# Patient Record
Sex: FEMALE | Race: BLACK OR AFRICAN AMERICAN | Employment: OTHER | URBAN - METROPOLITAN AREA
[De-identification: names, ages, dates, MRNs, and addresses within clinical notes are randomized per-mention and may not be internally consistent; named-entity substitution may affect disease eponyms.]

---

## 2017-04-21 ENCOUNTER — OFFICE VISIT (OUTPATIENT)
Dept: NEUROLOGY | Age: 62
End: 2017-04-21

## 2017-04-21 VITALS
DIASTOLIC BLOOD PRESSURE: 72 MMHG | OXYGEN SATURATION: 98 % | HEIGHT: 64 IN | RESPIRATION RATE: 18 BRPM | SYSTOLIC BLOOD PRESSURE: 112 MMHG | WEIGHT: 193 LBS | HEART RATE: 70 BPM | BODY MASS INDEX: 32.95 KG/M2

## 2017-04-21 DIAGNOSIS — G89.29 CHRONIC RIGHT-SIDED LOW BACK PAIN WITH RIGHT-SIDED SCIATICA: Primary | ICD-10-CM

## 2017-04-21 DIAGNOSIS — M54.41 CHRONIC RIGHT-SIDED LOW BACK PAIN WITH RIGHT-SIDED SCIATICA: Primary | ICD-10-CM

## 2017-04-21 DIAGNOSIS — R20.9 SKIN SENSATION DISTURBANCE: ICD-10-CM

## 2017-04-21 RX ORDER — ASPIRIN 81 MG/1
TABLET ORAL DAILY
COMMUNITY

## 2017-04-21 RX ORDER — METOPROLOL SUCCINATE 25 MG/1
TABLET, EXTENDED RELEASE ORAL 2 TIMES DAILY
COMMUNITY

## 2017-04-21 RX ORDER — GABAPENTIN 300 MG/1
300 CAPSULE ORAL 3 TIMES DAILY
Qty: 90 CAP | Refills: 1 | Status: SHIPPED | OUTPATIENT
Start: 2017-04-21 | End: 2020-06-01 | Stop reason: SDUPTHER

## 2017-04-21 RX ORDER — LEVOTHYROXINE SODIUM 25 UG/1
50 TABLET ORAL
COMMUNITY

## 2017-04-21 NOTE — PATIENT INSTRUCTIONS

## 2017-04-21 NOTE — PROGRESS NOTES
[facility]   NEW PATIENT EVALUATION/CONSULTATION       PATIENT NAME: Lee Haque    MRN: [id number]    REASON FOR CONSULTATION: Low back pain    04/21/17      Previous records (physician notes, laboratory reports, and radiology reports) and imaging studies were reviewed and summarized. My recommendations will be communicated back to the patient's physician(s) via electronic medical record and/or by [address] mail. HISTORY OF PRESENT ILLNESS:  Lee Haque is a 59 y.o. right handed female presenting for evaluation of low back pain. Sx started several years ago involving LBP with radiation down the posterior RLE to the knee described as \"sharp, shooting\". Sx are rather constant. Denies RLE paresthesias. She does feel the RLE is weak proximally>distally. No similar sx into the LLE. No significant lower back trauma that she recalls. Denies bowel/bladder incontinence or retention. She tried PT [employer]" which was minimally helpful. Also underwent KRISTINE in the past which were helpful. She currently takes naproxen for her LBP every other night. MRI Lumbar spine last completed 2012 for these sx and no significant NF or canal stenosis.       PAST MEDICAL HISTORY:  Past Medical History:   Diagnosis Date    Atrial fibrillation ([address]     Hyperlipemia     Migraine     Sleep apnea     Thyroid disease        PAST SURGICAL HISTORY:  Past Surgical History:   Procedure Laterality Date    HX GYN      HX POLYPECTOMY         FAMILY HISTORY:   Family History   Problem Relation Age of Onset    Headache Mother     Stroke Mother     Heart Disease Father     Stroke Brother     Cancer Maternal Grandmother     Heart Disease Paternal Grandfather          SOCIAL HISTORY:  Social History     Social History    Marital status:      Spouse name: N/A    Number of children: N/A    Years of education: N/A     Social History Main Topics    Smoking status: Never Smoker    Smokeless tobacco: None    Alcohol use No    Drug use: None    Sexual activity: Not Asked     Other Topics Concern    None     Social History Narrative         MEDICATIONS:   Current Outpatient Prescriptions   Medication Sig Dispense Refill    ROSUVASTATIN CALCIUM (CRESTOR PO) Take  by mouth.  levothyroxine (SYNTHROID) 25 mcg tablet Take  by mouth Daily (before breakfast). 1 and 1/2 pills every day.  metoprolol succinate (TOPROL-XL) 25 mg XL tablet Take  by mouth daily.  FEXOFENADINE HCL (ALLEGRA PO) Take  by mouth.  aspirin delayed-release 81 mg tablet Take  by mouth daily. ALLERGIES:  No Known Allergies      REVIEW OF SYSTEMS:  10 point ROS reviewed with patient. Please see scanned document under media. PHYSICAL EXAM:  Vital Signs:   Visit Vitals    /72    Pulse 70    Resp 18    Ht 5' 4\" (1.626 m)    Wt 87.5 kg (193 lb)    SpO2 98%    BMI 33.13 kg/m2        General Medical Exam:  General:  Well appearing, comfortable, in no apparent distress. Eyes/ENT: see cranial nerve examination. Neck: No masses appreciated. Full range of motion without tenderness. Respiratory:  Clear to auscultation, good air entry bilaterally. Cardiac:  Regular rate and rhythm, no murmur. GI:  Soft, non-tender, non-distended abdomen. Bowel sounds normal. No masses, organomegaly. Extremities:  No deformities, edema, or skin discoloration. Skin:  No rashes or lesions. Neurological:  · Mental Status:  Alert and oriented to person, place, and time with fluent speech. · Cranial Nerves:   CNII/III/IV/VI: visual fields full to confrontation, EOMI, PERRL, no ptosis or nystagmus.    CN V: Facial sensation intact bilaterally, masseter 5/5   CN VII: Facial muscles symmetric and strong   CN VIII: Hears finger rub well bilaterally, intact vestibular function   CN IX/X: Normal palatal movement   CN XI: Full strength shoulder shrug bilaterally   CN XII: Tongue protrusion full and midline without fasciculation or atrophy  · Motor: Normal tone and muscle bulk with no pronator drift. Individual muscle group testing:  Shoulder abduction:   Left:5/5   Right : 5/5    Shoulder adduction:   Left:5/5   Right : 5/5    Elbow flexion:      Left:5/5   Right : 5/5  Elbow extension:    Left:5/5   Right : 5/5   Wrist flexion:    Left:5/5   Right : 5/5  Wrist extension:    Left:5/5   Right : 5/5  Arm pronation:   Left:5/5   Right : 5/5  Arm supination:   Left:5/5   Right : 5/5    Finger flexion:    Left:5/5   Right : 5/5    Finger extension:   Left:5/5   Right : 5/5   Finger abduction:  Left:5/5   Right : 5/5   Finger adduction:   Left:5/5   Right : 5/5  Hip flexion:     Left:5/5   Right : 5/5         Hip extension:   Left:5/5   Right : 5/5    Knee flexion:    Left:5/5   Right : 5/5    Knee extension:   Left:5/5   Right : 5/5    Dorsiflexion:     Left:5/5   Right : 5/5  Plantar flexion:    Left:5/5   Right : 5/5      · MSRs: No crossed adductors or clonus. RIGHT  LEFT   Brachioradialis 1+ 1+   Biceps 1+ 1+   Triceps 1+ 1+   Knee 1+ 1+   Achilles trace trace        Plantar response Downward Downward          · Sensation: Decreased pinprick lateral distal RLE, otherwise normal and symmetric perception of temperature, light touch, proprioception, and vibration; (-) Romberg. · Coordination: No dysmetria. Normal rapid alternating movements; finger-to-nose and heel-to- shin testing are within normal limits. · Gait: Normal native and stress (tandem/heel/toe walking). PERTINENT DATA:  INTERNAL RECORDS:  The patient's electronic medical record was reviewed. The relevant details include:     CT Results (maximum last 3): No results found for this or any previous visit. MRI Results (maximum last 3): Results from [place] encounter on 11/02/12   MRI BRAIN W WO CONT   Narrative **Final Report**      ICD Codes / Adm. Diagnosis: 784.0  253.9 / Headache  Unspecified disorder of   the pi  Examination:  MRI BRAIN W AND WO CON - [id number] - Nov 2 2012  5:12PM  Accession No:  [id number]  Reason:  HEADACHE, RIGHT SIDED PARESTHESIAS, ABNORMAL PITUITARY LABS     ADDENDUM   Addendum:    Reportedly the patient refused diffusion-weighted images. This however is   unlikely to compromise pituitary evaluation. 4.  Left greater than right tonsillar ectopia. END OF ADDENDUM     REPORT:  Indication: Headaches. EXAM:  MRI BRAIN W AND WO CON    INDICATION:    HEADACHE, RIGHT SIDED PARESTHESIAS, ABNORMAL PITUITARY LABS    COMPARISON:  None. CONTRAST: 19 ml Magnevist    TECHNIQUE:  MR imaging of the brain was performed with sagittal T1, axial T1, T2, FLAIR,   GRE, DWI/ADC; pre and post contrast multiplanar T1 utilizing 19 mL   Magnevist.  Attention pituitary    FINDINGS: The ventricular size and configuration are normal. There are no   areas of abnormal signal in the cerebral hemispheres, brainstem or   cerebellum. The pituitary stalk is midline. There are no pathologic low   signal intensity or high signal intensity foci within the pituitary and the   chiasm is not encroached upon. No diffusion weighted sequences were   performed and therefore does not or absence of acute ischemia cannot be   completely evaluated. .  Normal appearing flow-voids are present in the   vertebral, basilar and carotid artery systems. There is 7 mm left and   approximately 3 mm right tonsillar ectopia. .  There is marked mucosal   thickening in the paranasal sinuses. . There is no abnormal parenchymal or   meningeal enhancement. The pituitary is 11 x 7 x 10 mm in size. IMPRESSION:  1. Study incomplete in that diffusion weighted images were not performed. If clinically desired, this can be performed in the near future at no   additional charge to complete the examination in that regard if clinically   desired. Staff has been informed. 2.  Sinusitis  3. Pituitary appears unremarkable.                  **SEE ADDENDUM AT TOP OF THE REPORT**    Signing/Reading Doctor: Rusty Avila ([id number]) Addendum Reading: Wade Edwards ([id number])  Approved: Rusty Avila ([id number])  Nov 5 2012 10:45AM                       Addendum Approved: Rusty Avila ([id number])  11/05/2012            Results from Hospital Encounter encounter on 03/22/12   MRI SPINE LUMBAR WITHOUT CONTRAST   Narrative **Final Report**      ICD Codes / Adm. Diagnosis: 724.4  722.52 / THORACIC OR LUMBOSACRAL NEURIT    DEGENERATION OF LUMBAR OR SACHA  Examination:  MRI L SPINE WO CON  - [id number] - Mar 22 2012  4:31PM  Accession No:  [id number]  Reason:  Thoracic or lumbosacral neuritis or radiculitis,   unspecified;Degeneration of lum      REPORT:  CLINICAL INDICATION: Right leg pain, back pain. Technical factors: Sagittal T1-weighted T2-weighted STIR, axial T1 and   T2-weighted L1-S1. Comparison 2008. There is no bone marrow replacement. The conus appears unremarkable and the   alignment is satisfactory. L2-L3 shows central disc bulging. There is mild bilateral foraminal   encroachment. L3-L4 shows central disc bulging. There is no lateralization. Mild bilateral   foraminal encroachment. L4-L5 shows chronic disc degeneration with adjacent endplate changes. There   is a central disc protrusion. There is no significant canal compromise. There is mild bilateral foraminal narrowing. L5-S1 shows central disc bulging. There is no stenosis or disc herniation. IMPRESSION: Multilevel disc degeneration spondylosis. No focal disc   herniation or stenosis. Signing/Reading Doctor: Trini Finley ([id number])    Approved: Trini Finley ([id number])  03/22/2012                                      ASSESSMENT:      ICD-10-CM ICD-9-CM    1. Chronic right-sided low back pain with right-sided sciatica M54.41 724.2       EMG LIMITED      gabapentin (NEURONTIN) 300 mg capsule   2.  Skin sensation disturbance R20.9 782.0       EMG LIMITED      gabapentin (NEURONTIN) 300 mg capsule   59year old female presenting with chronic LBP w/radicular sx involving the posterior RLE x several years. MRI Lumbar spine last completed 2012 and without significant canal or neuroforaminal stenosis, noted degenerative disc disease. Underwent PT/KRISTINE in the past which was somewhat helpful. Neurological examination today reveals decreased small fiber modalities L5 distribution of RLE. Will proceed with electrodiagnostic testing to exclude right L5 radiculopathy. Discussed trial of gabapentin for neuropathic pain which may be titrated if needed at f/u. She defers additional physical therapy at this time. PLAN:  · EMG RLE  · Start gabapentin 300mg TID      Follow-up Disposition: 1-2 weeks after EMG      I have discussed the diagnosis with the patient and the intended plan as seen in the above orders. Patient is in agreement. The patient has received an after-visit summary and questions were answered concerning future plans. I have discussed medication side effects and warnings with the patient as well. Radha Workman DO  Staff Neurologist  Diplomate, [facility] Board of Psychiatry & Neurology

## 2017-04-21 NOTE — MR AVS SNAPSHOT
Visit Information Date & Time Provider Department Dept. Phone Encounter #  
 4/21/2017  8:00 AM Daryl Willis DO [facility] at [address] [id number] Upcoming Health Maintenance Date Due Hepatitis C Screening 1955 DTaP/Tdap/Td series (1 - Tdap) 10/13/1974 PAP AKA CERVICAL CYTOLOGY 10/13/1974 FOBT Q 1 YEAR AGE 50-75 10/13/2003 ZOSTER VACCINE AGE 60> 10/13/2013 INFLUENZA AGE 9 TO ADULT 8/1/2016 BREAST CANCER SCRN MAMMOGRAM 10/17/2018 Allergies as of 4/21/2017  Review Complete On: 4/21/2017 By: Daryl Willis DO No Known Allergies Current Immunizations  Never Reviewed No immunizations on file. Not reviewed this visit You Were Diagnosed With   
  
 Codes Comments Chronic right-sided low back pain with right-sided sciatica    -  Primary ICD-10-CM: M54.41, G89.29 ICD-9-CM: 724.2, 724.3, 338.29 Skin sensation disturbance     ICD-10-CM: R20.9 ICD-9-CM: 875. 0 Vitals BP Pulse Resp Height(growth percentile) Weight(growth percentile) SpO2  
 112/72 70 18 5' 4\" (1.626 m) 193 lb (87.5 kg) 98% BMI OB Status Smoking Status 33.13 kg/m2 Hysterectomy Never Smoker Vitals History BMI and BSA Data Body Mass Index Body Surface Area  
 33.13 kg/m 2 1.99 m 2 Preferred Pharmacy [facility] [address], [address] [facility] [phone] Your Updated Medication List  
  
   
This list is accurate as of: 4/21/17  8:42 AM.  Always use your most recent med list. ALLEGRA PO Take  by mouth. aspirin delayed-release 81 mg tablet Take  by mouth daily. CRESTOR PO Take  by mouth.  
  
 gabapentin 300 mg capsule Commonly known as:  NEURONTIN Take 1 Cap by mouth three (3) times daily. metoprolol succinate 25 mg XL tablet Commonly known as:  TOPROL-XL  
 Take  by mouth daily. synthroid 25 mcg tablet Generic drug:  levothyroxine Take  by mouth Daily (before breakfast). 1 and 1/2 pills every day. Prescriptions Sent to Pharmacy Refills  
 gabapentin (NEURONTIN) 300 mg capsule 1 Sig: Take 1 Cap by mouth three (3) times daily. Class: Normal  
 Pharmacy: [facility]  Gwendolyn Billingsley, [address] [place] [address] #: [phone] Route: Oral  
  
To-Do List   
 04/21/2017 Neurology:  EMG LIMITED Patient Instructions A Healthy Lifestyle: Care Instructions Your Care Instructions A healthy lifestyle can help you feel good, stay at a healthy weight, and have plenty of energy for both work and play. A healthy lifestyle is something you can share with your whole family. A healthy lifestyle also can lower your risk for serious health problems, such as high blood pressure, heart disease, and diabetes. You can follow a few steps listed below to improve your health and the health of your family. Follow-up care is a key part of your treatment and safety. Be sure to make and go to all appointments, and call your doctor if you are having problems. Its also a good idea to know your test results and keep a list of the medicines you take. How can you care for yourself at home? · Do not eat too much sugar, fat, or fast foods. You can still have dessert and treats now and then. The goal is moderation. · Start small to improve your eating habits. Pay attention to portion sizes, drink less juice and soda pop, and eat more fruits and vegetables. ¨ Eat a healthy amount of food. A 3-ounce serving of meat, for example, is about the size of a deck of cards. Fill the rest of your plate with vegetables and whole grains. ¨ Limit the amount of soda and sports drinks you have every day. Drink more water when you are thirsty. ¨ Eat at least 5 servings of fruits and vegetables every day.  It may seem like a lot, but it is not hard to reach this goal. A serving or helping is 1 piece of fruit, 1 cup of vegetables, or 2 cups of leafy, raw vegetables. Have an apple or some carrot sticks as an afternoon snack instead of a candy bar. Try to have fruits and/or vegetables at every meal. 
· Make exercise part of your daily routine. You may want to start with simple activities, such as walking, bicycling, or slow swimming. Try to be active 30 to 60 minutes every day. You do not need to do all 30 to 60 minutes all at once. For example, you can exercise 3 times a day for 10 or 20 minutes. Moderate exercise is safe for most people, but it is always a good idea to talk to your doctor before starting an exercise program. 
· Keep moving. Scooby Stands the lawn, work in the garden, or Pantea. Take the stairs instead of the elevator at work. · If you smoke, quit. People who smoke have an increased risk for heart attack, stroke, cancer, and other lung illnesses. Quitting is hard, but there are ways to boost your chance of quitting tobacco for good. ¨ Use nicotine gum, patches, or lozenges. ¨ Ask your doctor about stop-smoking programs and medicines. ¨ Keep trying. In addition to reducing your risk of diseases in the future, you will notice some benefits soon after you stop using tobacco. If you have shortness of breath or asthma symptoms, they will likely get better within a few weeks after you quit. · Limit how much alcohol you drink. Moderate amounts of alcohol (up to 2 drinks a day for men, 1 drink a day for women) are okay. But drinking too much can lead to liver problems, high blood pressure, and other health problems. Family health If you have a family, there are many things you can do together to improve your health. · Eat meals together as a family as often as possible. · Eat healthy foods. This includes fruits, vegetables, lean meats and dairy, and whole grains. · Include your family in your fitness plan. Most people think of activities such as jogging or tennis as the way to fitness, but there are many ways you and your family can be more active. Anything that makes you breathe hard and gets your heart pumping is exercise. Here are some tips: 
¨ Walk to do errands or to take your child to school or the bus. ¨ Go for a family bike ride after dinner instead of watching TV. Where can you learn more? Go to http://celia-tony.info/. Enter K594 in the search box to learn more about \"A Healthy Lifestyle: Care Instructions. \" Current as of: July 26, 2016 Content Version: 11.2 © 4053-2172 [employer]. Care instructions adapted under license by [employer] (which disclaims liability or warranty for this information). If you have questions about a medical condition or this instruction, always ask your healthcare professional. [address] any warranty or liability for your use of this information. Introducing [facility]! Osmin Dai introduces [employer] patient portal. Now you can access parts of your medical record, email your doctor's office, and request medication refills online. 1. In your internet browser, go to [email or web address] 2. Click on the First Time User? Click Here link in the Sign In box. You will see the New Member Sign Up page. 3. Enter your [employer] Access Code exactly as it appears below. You will not need to use this code after youve completed the sign-up process. If you do not sign up before the expiration date, you must request a new code. · [employer] Access Code: [email or web address] Expires: 7/20/2017  7:43 AM 
 
4. Enter the last four digits of your Social Security Number (xxxx) and Date of Birth (mm/dd/yyyy) as indicated and click Submit. You will be taken to the next sign-up page. 5. Create a [employer] ID. This will be your [employer] login ID and cannot be changed, so think of one that is secure and easy to remember. 6. Create a [employer] password. You can change your password at any time. 7. Enter your Password Reset Question and Answer. This can be used at a later time if you forget your password. 8. Enter your e-mail address. You will receive e-mail notification when new information is available in [address] 9. Click Sign Up. You can now view and download portions of your medical record. 10. Click the Download Summary menu link to download a portable copy of your medical information. If you have questions, please visit the Frequently Asked Questions section of the [employer] website. Remember, [employer] is NOT to be used for urgent needs. For medical emergencies, dial 911. Now available from your iPhone and Android! Please provide this summary of care documentation to your next provider. Your primary care clinician [facility]. If you have any questions after today's visit, please call [phone].

## 2017-05-05 ENCOUNTER — OFFICE VISIT (OUTPATIENT)
Dept: NEUROLOGY | Age: 62
End: 2017-05-05

## 2017-05-05 VITALS
HEIGHT: 64 IN | WEIGHT: 193 LBS | DIASTOLIC BLOOD PRESSURE: 62 MMHG | BODY MASS INDEX: 32.95 KG/M2 | RESPIRATION RATE: 18 BRPM | TEMPERATURE: 98 F | HEART RATE: 58 BPM | SYSTOLIC BLOOD PRESSURE: 118 MMHG | OXYGEN SATURATION: 98 %

## 2017-05-05 DIAGNOSIS — M79.609 PARESTHESIA AND PAIN OF RIGHT EXTREMITY: Primary | ICD-10-CM

## 2017-05-05 DIAGNOSIS — R20.9 SKIN SENSATION DISTURBANCE: ICD-10-CM

## 2017-05-05 DIAGNOSIS — R20.2 PARESTHESIA AND PAIN OF RIGHT EXTREMITY: Primary | ICD-10-CM

## 2017-05-05 NOTE — PROGRESS NOTES
[facility]  [address] [place] [address]  Phone [phone] Fax [phone]  Test Date:  2017    Patient: Kari Lopez : 1955 Physician: Jaeysh Huff    Sex: Female Height: 5' 4\" Ref Ramos Silver   ID#: [id number] Weight: 193 lbs. Technician: Jorgito Pillai     Patient Complaints:  59year old female presenting with chronic low back pain w/radicular symptoms x several years. NCV & EMG Findings:  Evaluation of the left Sup Peroneal sensory and the right Sup Peroneal sensory nerves showed no response (14 cm). The left sural sensory and the right sural sensory nerves showed no response (Calf). All remaining nerves  were within normal limits. All left vs. right side differences were within normal limits. All examined muscles (as indicated in the following table) showed no evidence of electrical instability. Impression:  Extensive electrodiagnostic evaluation of the right lower extremity with additional nerve conduction studies of the left lower extremity reveal the followin. Absent sural and superficial peroneal sensory responses in both lower extremities. Findings may be normal for this age group or associated with technical factors given preserved large fiber sensory function on examination and normal needle examination. 2. No evidence of a right lumbosacral motor radiculopathy. ___________________________  Anastasiya Peralta,   Diplomate, American Board of Psychiatry & Neurology         Nerve Conduction Studies  Anti Sensory Summary Table     Stim Site NR Peak (ms) Norm Peak (ms) P-T Amp (µV) Norm P-T Amp Site1 Site2 Delta-P (ms) Dist (cm) Shade (m/s) Norm Shade (m/s)   Left Sup Peroneal Anti Sensory (Ant Lat Mall)  30.9°C   14 cm NR  <4.4  >5.0 14 cm Ant Lat Mall  14.0  >32   Right Sup Peroneal Anti Sensory (Ant Lat Mall)  31.5°C   14 cm NR  <4.4  >5.0 14 cm Ant Lat Mall  14.0 >32   Left Sural Anti Sensory (Lat Mall)  31°C   Calf NR  <4.0  >5.0 Calf Lat Mall  14.0  >35   Right Sural Anti Sensory (Lat Mall)  31.6°C   Calf NR  <4.0  >5.0 Calf Lat Mall  14.0  >35     Motor Summary Table     Stim Site NR Onset (ms) Norm Onset (ms) O-P Amp (mV) Norm O-P Amp Site1 Site2 Delta-0 (ms) Dist (cm) Shade (m/s) Norm Shade (m/s)   Left Peroneal Motor (Ext Dig Brev)  30.8°C   Ankle    3.2 <6.1 8.9 >2.5 B Fib Ankle 7.6 34.0 45 >38   B Fib    10.8  6.7  Poplt B Fib 1.2 10.0 83 >40   Poplt    12.0  6.5          Right Peroneal Motor (Ext Dig Brev)  31.9°C   Ankle    3.4 <6.1 8.5 >2.5 B Fib Ankle 7.3 34.0 47 >38   B Fib    10.7  6.8  Poplt B Fib 1.1 11.0 100 >40   Poplt    11.8  6.8          Left Tibial Motor (Abd Degroot Brev)  31.1°C   Ankle    5.3 <6.1 3.5 >3.0 Knee Ankle 6.5 35.0 54 >35   Knee    11.8  7.4          Right Tibial Motor (Abd Degroot Brev)  32.1°C   Ankle    4.6 <6.1 4.8 >3.0 Knee Ankle 7.7 36.0 47 >35   Knee    12.3  9.0            EMG     Side Muscle Nerve Root Ins Act Fibs Psw Amp Dur Poly Recrt Int Walker Kvng Comment   Right Ext Dig Brev Dp Br Peronel L5, S1 Nml Nml Nml Nml Nml 0 Nml Nml    Right AbdHallucis MedPlantar S1-2 Nml Nml Nml Nml Nml 0 Nml Nml    Right PostTibialis Tibial L5, S1 Nml Nml Nml Nml Nml 0 Nml Nml    Right Gastroc Tibial S1-2 Nml Nml Nml Nml Nml 0 Nml Nml    Right AntTibialis Dp Br Peronel L4-5 Nml Nml Nml Nml Nml 0 Nml Nml    Right RectFemoris Femoral L2-4 Nml Nml Nml Nml Nml 0 Nml Nml    Right GluteusMed SupGluteal L5-S1 Nml Nml Nml Nml Nml 0 Nml Nml    Right Lumbo Parasp Low Rami L5-S1 Nml Nml Nml               Waveforms:

## 2017-06-21 ENCOUNTER — OFFICE VISIT (OUTPATIENT)
Dept: NEUROLOGY | Age: 62
End: 2017-06-21

## 2017-06-21 VITALS
SYSTOLIC BLOOD PRESSURE: 136 MMHG | DIASTOLIC BLOOD PRESSURE: 74 MMHG | HEART RATE: 62 BPM | RESPIRATION RATE: 18 BRPM | BODY MASS INDEX: 32.27 KG/M2 | WEIGHT: 188 LBS | OXYGEN SATURATION: 98 %

## 2017-06-21 DIAGNOSIS — G89.29 CHRONIC RIGHT-SIDED LOW BACK PAIN, WITH SCIATICA PRESENCE UNSPECIFIED: Primary | ICD-10-CM

## 2017-06-21 DIAGNOSIS — M54.5 CHRONIC RIGHT-SIDED LOW BACK PAIN, WITH SCIATICA PRESENCE UNSPECIFIED: Primary | ICD-10-CM

## 2017-06-21 NOTE — PATIENT INSTRUCTIONS

## 2017-06-21 NOTE — PROGRESS NOTES
Neurology Clinic Follow up Note    Patient ID:  Bimal Zambrano  [id number]  13 y.o.  1955      Ms. Buster Mckenna is here for follow up today of  Chief Complaint   Patient presents with    Results          Last Appointment With Me:  4/21/2017       Interval History:   Patient returns for f/u of LBP w/radicular sx involving the posterior RLE x several years. She reports an episode of LBP with radicular pain going down the RLE last week. Pain appears somewhat improved overall on gabapentin. She is taking this once in the evenings due to fatigue during the AM.     EMG completed and no evidence of R lumbosacral radiculopathy. Absence sensory responses likely technical.        PMHx/ PSHx/ FHx/ SHx:  Reviewed and unchanged previous visit. ROS:  Comprehensive review of systems negative except for as noted above. Objective:       Meds:  Current Outpatient Prescriptions   Medication Sig Dispense Refill    ROSUVASTATIN CALCIUM (CRESTOR PO) Take  by mouth.  levothyroxine (SYNTHROID) 25 mcg tablet Take  by mouth Daily (before breakfast). 1 and 1/2 pills every day.  metoprolol succinate (TOPROL-XL) 25 mg XL tablet Take  by mouth daily.  FEXOFENADINE HCL (ALLEGRA PO) Take  by mouth.  aspirin delayed-release 81 mg tablet Take  by mouth daily.  gabapentin (NEURONTIN) 300 mg capsule Take 1 Cap by mouth three (3) times daily. (Patient taking differently: Take 300 mg by mouth daily.) 90 Cap 1       Exam:  Visit Vitals    /74    Pulse 62    Resp 18    Wt 85.3 kg (188 lb)    SpO2 98%    BMI 32.27 kg/m2     NEUROLOGICAL EXAM:  General: Awake, alert, speech fluent  CN: PERRL, EOMI without nystagmus, VFF to confrontation, facial sensation and strength are normal and symmetric, hearing is intact to finger rub bilaterally, palate and tongue movements are intact and symmetric. Motor: Normal tone, bulk and strength bilaterally.   Reflexes: 1/4 and symmetric, plantar stimulation is flexor. Coordination: FNF, RADHA, HTS intact. Sensation: LT intact throughout. Gait: Normal-based and steady. LABS  No results found for this or any previous visit. EMG:  Impression:  Extensive electrodiagnostic evaluation of the right lower extremity with additional nerve conduction studies of the left lower extremity reveal the followin. Absent sural and superficial peroneal sensory responses in both lower extremities. Findings may be normal for this age group or associated with technical factors given preserved large fiber sensory function on examination and normal needle examination. 2. No evidence of a right lumbosacral motor radiculopathy. Assessment:     Encounter Diagnoses     ICD-10-CM ICD-9-CM   1. Chronic right-sided low back pain, with sciatica presence unspecified M54.5 724.2    G89.29 35.29   59year old female returning for f/u of chronic LBP w/radicular sx involving the posterior RLE x several years. MRI Lumbar spine last completed  and without significant canal or neuroforaminal stenosis, noted degenerative disc disease. Underwent PT/K[facility]STINE in the past which was somewhat helpful. No evidence of a right lumbosacral radiculopathy on electrodiagnostic testing. Probable musculoskeletal etiology. Sx are attenuated with gabapentin, although she is not tolerating AM dosing. Suggest increasing single dose in the evening for better pain control. She is interested in pursuing additional PT. Plan:   Increase gabapentin to 600mg in the evenings after work to prevent morning fatigue  Physical therapy for chronic R LBP    Follow-up Disposition:  Return if symptoms worsen or fail to improve.       Signed:  Kailash Johns DO  2017

## 2017-06-21 NOTE — MR AVS SNAPSHOT
Visit Information Date & Time Provider Department Dept. Phone Encounter #  
 6/21/2017 10:00 AM Daryl Willis, [facility] at [address] [id number] Follow-up Instructions Return if symptoms worsen or fail to improve. Upcoming Health Maintenance Date Due Hepatitis C Screening 1955 DTaP/Tdap/Td series (1 - Tdap) 10/13/1974 PAP AKA CERVICAL CYTOLOGY 10/13/1974 FOBT Q 1 YEAR AGE 50-75 10/13/2003 ZOSTER VACCINE AGE 60> 10/13/2013 INFLUENZA AGE 9 TO ADULT 8/1/2017 BREAST CANCER SCRN MAMMOGRAM 10/17/2018 Allergies as of 6/21/2017  Review Complete On: 6/21/2017 By: Daryl Willis, DO No Known Allergies Current Immunizations  Never Reviewed No immunizations on file. Not reviewed this visit You Were Diagnosed With   
  
 Codes Comments Chronic right-sided low back pain, with sciatica presence unspecified    -  Primary ICD-10-CM: M54.5, G89.29 ICD-9-CM: 724.2, 338.29 Vitals BP Pulse Resp Weight(growth percentile) SpO2 BMI  
 136/74 62 18 188 lb (85.3 kg) 98% 32.27 kg/m2 OB Status Smoking Status Hysterectomy Never Smoker Vitals History BMI and BSA Data Body Mass Index Body Surface Area  
 32.27 kg/m 2 1.96 m 2 Preferred Pharmacy Pharmacy Name Phone [facility]  [address], [address] [place] [address] [phone] Your Updated Medication List  
  
   
This list is accurate as of: 6/21/17 10:16 AM.  Always use your most recent med list. ALLEGRA PO Take  by mouth. aspirin delayed-release 81 mg tablet Take  by mouth daily. CRESTOR PO Take  by mouth.  
  
 gabapentin 300 mg capsule Commonly known as:  NEURONTIN Take 1 Cap by mouth three (3) times daily. metoprolol succinate 25 mg XL tablet Commonly known as:  TOPROL-XL Take  by mouth daily. synthroid 25 mcg tablet Generic drug:  levothyroxine Take  by mouth Daily (before breakfast). 1 and 1/2 pills every day. We Performed the Following REFERRAL TO PHYSICAL THERAPY [OLR94 Custom] Comments:  
 Please evaluate patient for chronic LBP, no evidence of radiculopathy Follow-up Instructions Return if symptoms worsen or fail to improve. Referral Information Referral ID Referred By Referred To  
  
 [id number] Nadia CID Not Available Visits Status Start Date End Date 1 New Request 6/21/17 6/21/18 If your referral has a status of pending review or denied, additional information will be sent to support the outcome of this decision. Patient Instructions A Healthy Lifestyle: Care Instructions Your Care Instructions A healthy lifestyle can help you feel good, stay at a healthy weight, and have plenty of energy for both work and play. A healthy lifestyle is something you can share with your whole family. A healthy lifestyle also can lower your risk for serious health problems, such as high blood pressure, heart disease, and diabetes. You can follow a few steps listed below to improve your health and the health of your family. Follow-up care is a key part of your treatment and safety. Be sure to make and go to all appointments, and call your doctor if you are having problems. Its also a good idea to know your test results and keep a list of the medicines you take. How can you care for yourself at home? · Do not eat too much sugar, fat, or fast foods. You can still have dessert and treats now and then. The goal is moderation. · Start small to improve your eating habits. Pay attention to portion sizes, drink less juice and soda pop, and eat more fruits and vegetables. ¨ Eat a healthy amount of food. A 3-ounce serving of meat, for example, is about the size of a deck of cards.  Fill the rest of your plate with vegetables and whole grains. ¨ Limit the amount of soda and sports drinks you have every day. Drink more water when you are thirsty. ¨ Eat at least 5 servings of fruits and vegetables every day. It may seem like a lot, but it is not hard to reach this goal. A serving or helping is 1 piece of fruit, 1 cup of vegetables, or 2 cups of leafy, raw vegetables. Have an apple or some carrot sticks as an afternoon snack instead of a candy bar. Try to have fruits and/or vegetables at every meal. 
· Make exercise part of your daily routine. You may want to start with simple activities, such as walking, bicycling, or slow swimming. Try to be active 30 to 60 minutes every day. You do not need to do all 30 to 60 minutes all at once. For example, you can exercise 3 times a day for 10 or 20 minutes. Moderate exercise is safe for most people, but it is always a good idea to talk to your doctor before starting an exercise program. 
· Keep moving. Americo Vinod the lawn, work in the garden, or [employer]. Take the stairs instead of the elevator at work. · If you smoke, quit. People who smoke have an increased risk for heart attack, stroke, cancer, and other lung illnesses. Quitting is hard, but there are ways to boost your chance of quitting tobacco for good. ¨ Use nicotine gum, patches, or lozenges. ¨ Ask your doctor about stop-smoking programs and medicines. ¨ Keep trying. In addition to reducing your risk of diseases in the future, you will notice some benefits soon after you stop using tobacco. If you have shortness of breath or asthma symptoms, they will likely get better within a few weeks after you quit. · Limit how much alcohol you drink. Moderate amounts of alcohol (up to 2 drinks a day for men, 1 drink a day for women) are okay. But drinking too much can lead to liver problems, high blood pressure, and other health problems. Family health If you have a family, there are many things you can do together to improve your health. · Eat meals together as a family as often as possible. · Eat healthy foods. This includes fruits, vegetables, lean meats and dairy, and whole grains. · Include your family in your fitness plan. Most people think of activities such as jogging or tennis as the way to fitness, but there are many ways you and your family can be more active. Anything that makes you breathe hard and gets your heart pumping is exercise. Here are some tips: 
¨ Walk to do errands or to take your child to school or the bus. ¨ Go for a family bike ride after dinner instead of watching TV. Where can you learn more? Go to http://celiaQiwi Posttony.info/. Enter H097 in the search box to learn more about \"A Healthy Lifestyle: Care Instructions. \" Current as of: July 26, 2016 Content Version: 11.3 © 4219-8449 [employer]ah[employer]. Care instructions adapted under license by [employer] (which disclaims liability or warranty for this information). If you have questions about a medical condition or this instruction, always ask your healthcare professional. [address] any warranty or liability for your use of this information. Introducing [facility]! [place] introduces [employer] patient portal. Now you can access parts of your medical record, email your doctor's office, and request medication refills online. 1. In your internet browser, go to [email or web address] 2. Click on the First Time User? Click Here link in the Sign In box. You will see the New Member Sign Up page. 3. Enter your [employer] Access Code exactly as it appears below. You will not need to use this code after youve completed the sign-up process. If you do not sign up before the expiration date, you must request a new code. · [employer] Access Code: [email or web address] Expires: 7/20/2017  7:43 AM 
 
4.  Enter the last four digits of your Social Security Number (xxxx) and Date of Birth (mm/dd/yyyy) as indicated and click Submit. You will be taken to the next sign-up page. 5. Create a Crystalsol ID. This will be your Crystalsol login ID and cannot be changed, so think of one that is secure and easy to remember. 6. Create a Crystalsol password. You can change your password at any time. 7. Enter your Password Reset Question and Answer. This can be used at a later time if you forget your password. 8. Enter your e-mail address. You will receive e-mail notification when new information is available in [address] 9. Click Sign Up. You can now view and download portions of your medical record. 10. Click the Download Summary menu link to download a portable copy of your medical information. If you have questions, please visit the Frequently Asked Questions section of the Crystalsol website. Remember, Crystalsol is NOT to be used for urgent needs. For medical emergencies, dial 911. Now available from your iPhone and Android! Please provide this summary of care documentation to your next provider. Your primary care clinician is listed as Fredo Zavala. If you have any questions after today's visit, please call [phone].

## 2019-04-06 ENCOUNTER — HOSPITAL ENCOUNTER (OUTPATIENT)
Dept: ULTRASOUND IMAGING | Age: 64
Discharge: HOME | End: 2019-04-06
Attending: PHYSICIAN ASSISTANT
Payer: COMMERCIAL

## 2019-04-06 DIAGNOSIS — R10.12 ABDOMINAL PAIN, LEFT UPPER QUADRANT: ICD-10-CM

## 2019-04-06 PROCEDURE — 76856 US EXAM PELVIC COMPLETE: CPT

## 2019-04-06 PROCEDURE — 76700 US EXAM ABDOM COMPLETE: CPT

## 2020-03-24 ENCOUNTER — OFFICE VISIT (OUTPATIENT)
Dept: SLEEP MEDICINE | Age: 65
End: 2020-03-24

## 2020-03-24 VITALS
SYSTOLIC BLOOD PRESSURE: 104 MMHG | HEIGHT: 64 IN | WEIGHT: 200 LBS | TEMPERATURE: 98.6 F | HEART RATE: 66 BPM | OXYGEN SATURATION: 98 % | BODY MASS INDEX: 34.15 KG/M2 | DIASTOLIC BLOOD PRESSURE: 68 MMHG

## 2020-03-24 DIAGNOSIS — G47.33 OSA (OBSTRUCTIVE SLEEP APNEA): Primary | ICD-10-CM

## 2020-03-24 DIAGNOSIS — E07.9 THYROID DISEASE: ICD-10-CM

## 2020-03-24 DIAGNOSIS — I48.91 ATRIAL FIBRILLATION, UNSPECIFIED TYPE (HCC): ICD-10-CM

## 2020-03-24 NOTE — PATIENT INSTRUCTIONS
[address] [place], [address]  Tel.  [phone]  Fax. [phone]  [place], [address] [facility]  Tel.  [phone]  Fax. [phone] [address]  Tel.  [phone]  Fax. [phone]     Sleep Apnea: After Your Visit  Your Care Instructions  Sleep apnea occurs when you frequently stop breathing for 10 seconds or longer during sleep. It can be mild to severe, based on the number of times per hour that you stop breathing or have slowed breathing. Blocked or narrowed airways in your nose, mouth, or throat can cause sleep apnea. Your airway can become blocked when your throat muscles and tongue relax during sleep. Sleep apnea is common, occurring in 1 out of 20 individuals. Individuals having any of the following characteristics should be evaluated and treated right away due to high risk and detrimental consequences from untreated sleep apnea:  1. Obesity  2. Congestive Heart failure  3. Atrial Fibrillation  4. Uncontrolled Hypertension  5. Type II Diabetes  6. Night-time Arrhythmias  7. Stroke  8. Pulmonary Hypertension  9. High-risk Driving Populations (pilots, truck drivers, etc.)  10. Patients Considering Weight-loss Surgery    How do you know you have sleep apnea? You probably have sleep apnea if you answer 'yes' to 3 or more of the following questions:  S - Have you been told that you Snore? T - Are you often Tired during the day? O - Has anyone Observed you stop breathing while sleeping? P- Do you have (or are being treated for) high blood Pressure? B - Are you obese (Body Mass Index > 35)? A - Is your Age 48years old or older? N - Is your Neck size greater than 16 inches? G - Are you male Gender? A sleep physician can prescribe a breathing device that prevents tissues in the throat from blocking your airway.  Or your doctor may recommend using a dental device (oral breathing device) to help keep your airway open. In some cases, surgery may be needed to remove enlarged tissues in the throat. Follow-up care is a key part of your treatment and safety. Be sure to make and go to all appointments, and call your doctor if you are having problems. It's also a good idea to know your test results and keep a list of the medicines you take. How can you care for yourself at home? · Lose weight, if needed. It may reduce the number of times you stop breathing or have slowed breathing. · Go to bed at the same time every night. · Sleep on your side. It may stop mild apnea. If you tend to roll onto your back, sew a pocket in the back of your pajama top. Put a tennis ball into the pocket, and stitch the pocket shut. This will help keep you from sleeping on your back. · Avoid alcohol and medicines such as sleeping pills and sedatives before bed. · Do not smoke. Smoking can make sleep apnea worse. If you need help quitting, talk to your doctor about stop-smoking programs and medicines. These can increase your chances of quitting for good. · Prop up the head of your bed 4 to 6 inches by putting bricks under the legs of the bed. · Treat breathing problems, such as a stuffy nose, caused by a cold or allergies. · Use a continuous positive airway pressure (CPAP) breathing machine if lifestyle changes do not help your apnea and your doctor recommends it. The machine keeps your airway from closing when you sleep. · If CPAP does not help you, ask your doctor whether you should try other breathing machines. A bilevel positive airway pressure machine has two types of air pressureâone for breathing in and one for breathing out. Another device raises or lowers air pressure as needed while you breathe. · If your nose feels dry or bleeds when using one of these machines, talk with your doctor about increasing moisture in the air. A humidifier may help.   · If your nose is runny or stuffy from using a breathing machine, talk with your doctor about using decongestants or a corticosteroid nasal spray. When should you call for help? Watch closely for changes in your health, and be sure to contact your doctor if:  · You still have sleep apnea even though you have made lifestyle changes. · You are thinking of trying a device such as CPAP. · You are having problems using a CPAP or similar machine. Where can you learn more? Go to PlanetEye. Enter S990 in the search box to learn more about \"Sleep Apnea: After Your Visit. \"   © 8331-5136 Healthwise, Incorporated. Care instructions adapted under license by [facility] [employer] (which disclaims liability or warranty for this information). This care instruction is for use with your licensed healthcare professional. If you have questions about a medical condition or this instruction, always ask your healthcare professional. Verna Wray any warranty or liability for your use of this information. PROPER SLEEP HYGIENE    What to avoid  · Do not have drinks with caffeine, such as coffee or black tea, for 8 hours before bed. · Do not smoke or use other types of tobacco near bedtime. Nicotine is a stimulant and can keep you awake. · Avoid drinking alcohol late in the evening, because it can cause you to wake in the middle of the night. · Do not eat a big meal close to bedtime. If you are hungry, eat a light snack. · Do not drink a lot of water close to bedtime, because the need to urinate may wake you up during the night. · Do not read or watch TV in bed. Use the bed only for sleeping and sexual activity. What to try  · Go to bed at the same time every night, and wake up at the same time every morning. Do not take naps during the day. · Keep your bedroom quiet, dark, and cool. · Get regular exercise, but not within 3 to 4 hours of your bedtime. .  · Sleep on a comfortable pillow and mattress.   · If watching the clock makes you anxious, turn it facing away from you so you cannot see the time. · If you worry when you lie down, start a worry book. Well before bedtime, write down your worries, and then set the book and your concerns aside. · Try meditation or other relaxation techniques before you go to bed. · If you cannot fall asleep, get up and go to another room until you feel sleepy. Do something relaxing. Repeat your bedtime routine before you go to bed again. · Make your house quiet and calm about an hour before bedtime. Turn down the lights, turn off the TV, log off the computer, and turn down the volume on music. This can help you relax after a busy day. Drowsy Driving  The [address] [place] [address] Road Traffic Safety Administration cites drowsiness as a causing factor in more than 831,663 police reported crashes annually, resulting in 76,000 injuries and 1,500 deaths. Other surveys suggest 55% of people polled have driven while drowsy in the past year, 23% had fallen asleep but not crashed, 3% crashed, and 2% had and accident due to drowsy driving. Who is at risk? Young Drivers: One study of drowsy driving accidents states that 55% of the drivers were under 25 years. Of those, 75% were male. Shift Workers and Travelers: People who work overnight or travel across time zones frequently are at higher risk of experiencing Circadian Rhythm Disorders. They are trying to work and function when their body is programed to sleep. Sleep Deprived: Lack of sleep has a serious impact on your ability to pay attention or focus on a task. Consistently getting less than the average of 8 hours your body needs creates partial or cumulative sleep deprivation. Untreated Sleep Disorders: Sleep Apnea, Narcolepsy, R.L.S., and other sleep disorders (untreated) prevent a person from getting enough restful sleep. This leads to excessive daytime sleepiness and increases the risk for drowsy driving accidents by up to 7 times.   Medications / Alcohol: Even over the counter medications can cause drowsiness. Medications that impair a drivers attention should have a warning label. Alcohol naturally makes you sleepy and on its own can cause accidents. Combined with excessive drowsiness its effects are amplified. Signs of Drowsy Driving:   * You don't remember driving the last few miles   * You may drift out of your jo ann   * You are unable to focus and your thoughts wander   * You may yawn more often than normal   * You have difficulty keeping your eyes open / nodding off   * Missing traffic signs, speeding, or tailgating  Prevention-   Good sleep hygiene, lifestyle and behavioral choices have the most impact on drowsy driving. There is no substitute for sleep and the average person requires 8 hours nightly. If you find yourself driving drowsy, stop and sleep. Consider the sleep hygiene tips provided during your visit as well. Medication Refill Policy: Refills for all medications require 1 week advance notice. Please have your pharmacy fax a refill request. We are unable to fax, or call in \"controled substance\" medications and you will need to pick these prescriptions up from our office. SimpleThe[employer] Activation    Thank you for requesting access to [employer]. Please follow the instructions below to securely access and download your online medical record. [employer] allows you to send messages to your doctor, view your test results, renew your prescriptions, schedule appointments, and more. How Do I Sign Up? 1. In your internet browser, go to [email or web address]. 2. Click on the First Time User? Click Here link in the Sign In box. You will see the New Member Sign Up page. 3. Enter your [facility] Access Code exactly as it appears below. You will not need to use this code after youve completed the sign-up process. If you do not sign up before the expiration date, you must request a new code.     SimpleTherapy Access Code: [email or web address]  Expires: 5/8/2020  9:44 AM (This is the date your tarpipe access code will )    4. Enter the last four digits of your Social Security Number (xxxx) and Date of Birth (mm/dd/yyyy) as indicated and click Submit. You will be taken to the next sign-up page. 5. Create a BLiNQ Mediat ID. This will be your tarpipe login ID and cannot be changed, so think of one that is secure and easy to remember. 6. Create a tarpipe password. You can change your password at any time. 7. Enter your Password Reset Question and Answer. This can be used at a later time if you forget your password. 8. Enter your e-mail address. You will receive e-mail notification when new information is available in [address] 9. Click Sign Up. You can now view and download portions of your medical record. 10. Click the Download Summary menu link to download a portable copy of your medical information. Additional Information    If you have questions, please call [phone]. Remember, tarpipe is NOT to be used for urgent needs. For medical emergencies, dial 911.

## 2020-03-24 NOTE — PROGRESS NOTES
[address] [facility] [place]. [place], [address]  Tel.  [phone]  Fax. 100 [facility] 60  [place], [address] [facility]  Tel.  [phone]  Fax. [phone] [address]  Tel.  [phone]  Fax. [phone]         Subjective:        Telemedicine visit performed with verbal consent of the patient. Demian Colon is an 75 y.o. female self-referred for evaluation for a sleep disorder. She complains of having been told that she breathes hard in her sleep associated with perhaps periods of not breathing. Symptoms began 4 years ago, she was diagnosed with GILDA and has been prescribed a PAP Device. She is not able to use the device due to difficulty sleepy. She usually gets in bed by midnight start therapy and views television until about 2:00 am and then falls asleep, she wakes up frequently through the night and discontinues therapy at about 5:00 am and sleeps until 7:00 am.   She does report of feeling completely or partially paralyzed while waking up prior to PAP therapy. Demian Colon does wake up frequently at night. She is bothered by waking up too early and left unable to get back to sleep. She actually sleeps about 5 hours at night and wakes up about 3 times during the night. She does not work shifts:  . Arturo Arroyo indicates she does get too little sleep at night. Her bedtime is 0000. She awakens at 0600. She does not take naps. She has the following observed behaviors: Twitching of legs or feet, Pauses in breathing;  . Other remarks:   She has not received any new supplies since 2018. [place] Sleepiness Score: 11 which reflect mild daytime drowsiness. No Known Allergies      Current Outpatient Medications:     ROSUVASTATIN CALCIUM (CRESTOR PO), Take  by mouth., Disp: , Rfl:     levothyroxine (SYNTHROID) 25 mcg tablet, Take  by mouth Daily (before breakfast).  1 and 1/2 pills every day., Disp: , Rfl:     metoprolol succinate (TOPROL-XL) 25 mg XL tablet, Take  by mouth daily. , Disp: , Rfl:     FEXOFENADINE HCL (ALLEGRA PO), Take  by mouth., Disp: , Rfl:     aspirin delayed-release 81 mg tablet, Take  by mouth daily. , Disp: , Rfl:     gabapentin (NEURONTIN) 300 mg capsule, Take 1 Cap by mouth three (3) times daily. (Patient taking differently: Take 300 mg by mouth daily.), Disp: 90 Cap, Rfl: 1     She  has a past medical history of Atrial fibrillation ([address] Hyperlipemia, Migraine, Sleep apnea, and Thyroid disease. She  has a past surgical history that includes hx polypectomy and hx gyn. She family history includes Cancer in her maternal grandmother; Headache in her mother; Heart Disease in her father and paternal grandfather; Stroke in her brother and mother. She  reports that she has never smoked. She has never used smokeless tobacco. She reports that she does not drink alcohol. Review of Systems:  Constitutional:  + significant weight gain - 200 lbs  Eyes:  No blurred vision  CVS:  No significant chest pain - on metoprolol for atrial fibrillation. Pulm:  No significant shortness of breath  GI:  No significant nausea or vomiting  : + significant nocturia  Musculoskeletal:  No significant joint pain at night  Skin:  No significant rashes  Neuro:  + significant dizziness - hospitalized 2-3 times with diagnosis of vertigo  Psych:  No active mood issues    Sleep Review of Systems: notable for no difficulty falling asleep; infrequent awakenings at night;  regular dreaming noted; no nightmares ; no early morning headaches; no memory problems; no concentration issues; no history of any automobile or occupational accidents due to daytime drowsiness.       Objective:     Visit Vitals  /68   Pulse 66   Temp 98.6 °F (37 °C)   Ht 5' 4\" (1.626 m)   Wt 200 lb (90.7 kg)   SpO2 98%   BMI 34.33 kg/m²           General:   Alert, oriented, not in distress   Respiratory  Appeared to be breathing comfortably   Neuro:  Fluent Speech; No obvious facial asymmetry    Psych:  Normal affect ,  Normal countenance ;           Assessment:       ICD-10-CM ICD-9-CM    1. GILDA (obstructive sleep apnea) G47.33 327.23 SLEEP STUDY UNATTENDED, 4 CHANNEL   2. BMI 34.0-34.9,adult Z68.34 V85.34    3. Thyroid disease E07.9 246.9    4. Atrial fibrillation, unspecified type ([facility] I48.91 427.31          Plan:       * Sleep testing was ordered for initial evaluation as initial sleep test is not available for review at this. * Patient agrees to stop using her PAP therapy for a week prior to testing. Orders Placed This Encounter    SLEEP STUDY UNATTENDED, 4 CHANNEL     Order Specific Question:   Reason for Exam     Answer:   GILDA       * She will be prescribed new supplies with Nasal Mask / Non-Heated tube and device reset to 6-9 cmH2O. * Follow-up in 3 months or as needed. * Counseling was provided regarding proper sleep hygiene, appropriate sleep schedule (MN to 7:00 am), need for sleep environment safety and safe driving. * Need to avoid using the bed for television viewing, paradoxical intention and stimulus control therapy reviewed. * Effect of sleep disturbance on weight was reviewed. We have recommended a dedicated weight loss through appropriate diet and an exercise regiment as significant weight reduction has been shown to reduce severity of obstructive sleep apnea. * Patient agrees to telephone [phone]  follow-up by myself or lead sleep technologist shortly after sleep study to review results and plan final management.     (patient has given permission for a message to be left regarding test results and further management if patient cannot be cannot be reached directly). Thank you for allowing us to participate in your patient's medical care. We'll keep you updated on these investigations. Valarie Anderson MD, FAASM  Electronically signed.  03/24/20

## 2020-03-31 ENCOUNTER — HOSPITAL ENCOUNTER (OUTPATIENT)
Dept: SLEEP MEDICINE | Age: 65
Discharge: HOME | End: 2020-03-31
Payer: COMMERCIAL

## 2020-03-31 ENCOUNTER — OFFICE VISIT (OUTPATIENT)
Dept: SLEEP MEDICINE | Age: 65
End: 2020-03-31

## 2020-03-31 DIAGNOSIS — G47.33 OBSTRUCTIVE SLEEP APNEA (ADULT) (PEDIATRIC): Primary | ICD-10-CM

## 2020-03-31 PROCEDURE — 95806 SLEEP STUDY UNATT&RESP EFFT: CPT | Performed by: INTERNAL MEDICINE

## 2020-03-31 NOTE — PROGRESS NOTES
[address] [facility] [address] [place]. [place], [address]  Tel.  [phone]  Fax. 100 [facility] 60  [place], [address] [facility]  Tel.  [phone]  Fax. [phone] [address] [facility] Minerva Dominguez   Tel.  [phone]  Fax. [phone]       S>Carol Moses is a 75 y.o. female seen today to receive a home sleep testing unit (HST). · Patient was educated on proper hookup and operation of the HST. · Instruction forms and documentation were reviewed and signed. · The patient demonstrated good understanding of the HST.    O>    There were no vitals taken for this visit. A>  No diagnosis found. P>  · General information regarding operations and maintenance of the device was provided. · She was provided information on sleep apnea including coresponding risk factors and the importance of proper treatment. · Follow-up appointment was made to return the HST. She will be contacted once the results have been reviewed. · She was asked to contact our office for any problems regarding her home sleep test study.

## 2020-04-09 ENCOUNTER — DOCUMENTATION ONLY (OUTPATIENT)
Dept: SLEEP MEDICINE | Age: 65
End: 2020-04-09

## 2020-04-09 ENCOUNTER — TELEPHONE (OUTPATIENT)
Dept: SLEEP MEDICINE | Age: 65
End: 2020-04-09

## 2020-04-09 DIAGNOSIS — G47.33 OSA (OBSTRUCTIVE SLEEP APNEA): Primary | ICD-10-CM

## 2020-04-09 NOTE — TELEPHONE ENCOUNTER
Hermes Huggins is to be contacted by lead sleep technologist regarding results of Sleep Testing which was indicative of an average AHI of 17.8 per hour with an SpO2 paola of 86% and SpO2 of < 88% being 0 minutes. Patient currently has an APAP device which should be reset to 6 - 9 cmH2O. A prescription has been written of supplies and patient will be contacted by office staff regarding follow-up  in 3 months. Encounter Diagnosis   Name Primary?  GILDA (obstructive sleep apnea) Yes       Orders Placed This Encounter    AMB SUPPLY ORDER     Diagnosis: (G47.33) GILDA (obstructive sleep apnea)  (primary encounter diagnosis)     Replacement Supplies for Positive Airway Pressure Therapy Device:   Duration of need: 99 months.  Nasal Cushion (Replace) 2 per month.  Nasal Interface Mask 1 every 3 months.  Headgear 1 every 6 months.  Tubing 1 every 3 months.  Filter(s) Disposable 2 per month.  Filter(s) Non-Disposable 1 every 6 months. .   [address] [facility] [address] for Humidifier (Replace) 1 every 6 months. Aura Wilkes MD, FAASM; NPI: [id number]    Electronically signed.  Date:- 04/09/20

## 2020-04-09 NOTE — PROGRESS NOTES
This note is being routed to Dr. Vikram Rachel MD         Sleep Medicine consult note and sleep study report in patient's chart for review.     Thank you for the referral.

## 2020-04-13 ENCOUNTER — DOCUMENTATION ONLY (OUTPATIENT)
Dept: SLEEP MEDICINE | Age: 65
End: 2020-04-13

## 2020-04-13 NOTE — TELEPHONE ENCOUNTER
Reviewed sleep study results with patient. She expressed understanding and is willing to proceed with a trial of APAP. Pressures changed to APAP 6-9 cmH2O via the modem. The patient was notified of the change. Patient instructed to call back if she has issues with the pressures. Fax DME order & Schedule 1st adherence visit in 60 to 90 days. Please send order to [facility]. Ms. Nelia Bustillo needs order for Repair/Replace.

## 2020-04-21 NOTE — TELEPHONE ENCOUNTER
Orders Placed This Encounter    AMB SUPPLY ORDER     Diagnosis: Sleep Apnea ICD-10 Code (G47.30); ICD-9 Code (780.57). Positive Airway Pressure Therapy: Duration of need: 99 months. ResMed Device with Heated Humidifer [id number] / . Set Pressure: 6 - 9 cmH2O    Service and Repair patient PAP device. Replace if damaged beyond repair.  Nasal Pillows Combo Mask (Replace) 2 per month.  Nasal Pillows (Replace) 2 per month.  Full Face Mask 1 every 3 months.  Full Face Mask Cushion 1 per month.  Nasal Cushion (Replace) 2 per month.  Nasal Interface Mask 1 every 3 months.  Headgear 1 every 6 months.  Chinstrap 1 every 6 months.  Tubing 1 every 3 months.  Tubing with heating element 1 every 3 months.  Filter(s) Disposable 2 per month.  Filter(s) Non-Disposable 1 every 6 months. Perform Mask Fitting per patient preference and comfort - replace as above. Rommel Riddle MD, FAA; NPI: [id number]  Electronically signed.  04/21/20

## 2020-05-26 ENCOUNTER — TELEPHONE (OUTPATIENT)
Dept: SLEEP MEDICINE | Age: 65
End: 2020-05-26

## 2020-05-26 DIAGNOSIS — G47.33 OSA (OBSTRUCTIVE SLEEP APNEA): Primary | ICD-10-CM

## 2020-05-26 NOTE — TELEPHONE ENCOUNTER
Orders Placed This Encounter    AMB SUPPLY ORDER     Diagnosis: Sleep Apnea ICD-10 Code (G47.30); ICD-9 Code (780.57). * Device pressure change to APAP  4-8 cmH2O. Bhakti Bustos MD, FAA; NPI: [id number]  Electronically signed.  05/26/20

## 2020-06-01 ENCOUNTER — VIRTUAL VISIT (OUTPATIENT)
Dept: NEUROLOGY | Age: 65
End: 2020-06-01

## 2020-06-01 VITALS — RESPIRATION RATE: 18 BRPM | BODY MASS INDEX: 34.33 KG/M2 | HEIGHT: 64 IN

## 2020-06-01 DIAGNOSIS — G89.29 CHRONIC RIGHT-SIDED LOW BACK PAIN WITH RIGHT-SIDED SCIATICA: ICD-10-CM

## 2020-06-01 DIAGNOSIS — M54.2 CERVICALGIA: ICD-10-CM

## 2020-06-01 DIAGNOSIS — M79.2 RADICULAR PAIN IN LEFT ARM: Primary | ICD-10-CM

## 2020-06-01 DIAGNOSIS — M54.41 CHRONIC RIGHT-SIDED LOW BACK PAIN WITH RIGHT-SIDED SCIATICA: ICD-10-CM

## 2020-06-01 DIAGNOSIS — R20.9 SKIN SENSATION DISTURBANCE: ICD-10-CM

## 2020-06-01 RX ORDER — METHYLPREDNISOLONE 4 MG/1
TABLET ORAL
Qty: 1 DOSE PACK | Refills: 0 | Status: SHIPPED | OUTPATIENT
Start: 2020-06-01

## 2020-06-01 RX ORDER — ASPIRIN 325 MG
TABLET, DELAYED RELEASE (ENTERIC COATED) ORAL
COMMUNITY

## 2020-06-01 RX ORDER — SWAB
1 SWAB, NON-MEDICATED MISCELLANEOUS DAILY
COMMUNITY

## 2020-06-01 RX ORDER — FLUTICASONE PROPIONATE 50 MCG
2 SPRAY, SUSPENSION (ML) NASAL DAILY
COMMUNITY

## 2020-06-01 RX ORDER — GLUCOSAMINE/CHONDR SU A SOD 750-600 MG
TABLET ORAL
COMMUNITY

## 2020-06-01 RX ORDER — GABAPENTIN 300 MG/1
300 CAPSULE ORAL 3 TIMES DAILY
Qty: 90 CAP | Refills: 2 | Status: SHIPPED | OUTPATIENT
Start: 2020-06-01

## 2020-06-01 NOTE — PROGRESS NOTES
Neurology Clinic Follow up Note    Patient ID:  Cary Peterson  [id number]  38 y.o.  1955      Ms. Irene Sanchez is here for follow up today of  Chief Complaint   Patient presents with    Numbness          Last Appointment With Me:  6/21/17      This is a telemedicine visit that was performed with the originating site at [facility] and the distant site at patient's home. Verbal consent to participate in video visit was obtained. The patient was identified by name and date of birth. This visit occurred during the Coronavirus [phone]) [facility]. I discussed with the patient the nature of our telemedicine visits, that:     I would evaluate the patient and recommend diagnostics and treatments based on my assessment   Our sessions are not being recorded and that personal health information is protected   Our team would provide follow up care in person if/when the patient needs it    Interval History:   Patient returns for f/u after 3 years to discuss new sx: LUE paresthesias/pain x 3-4 months, progressive. She is experiencing pain extending from the L scapula down the back of the LUE to the entire hand. Sx are constant with associated pins/needles. She notes when moving her neck she experiences some pain near the neck traveling into the arm. No recent falls or neck injuries. No associated LUE weakness. Denies similar sx of the RUE. She is taking gabapentin 300mg qhs for above symptoms with minimal relief. She also mentions that her more chronic LBP and RLE radicular symptoms are bothersome. She underwent EMG 2017 without significant abnormalities/radiculopathy. Prior MRI Lumbar spine 3/2012 showed evidence of multilevel degenerative disc disease without significant canal or NFS. PMHx/ PSHx/ FHx/ SHx:  Reviewed and unchanged previous visit.    Past Medical History:   Diagnosis Date    Atrial fibrillation ([address]     Hyperlipemia     Migraine     Sleep apnea     Thyroid disease      Past Surgical History:   Procedure Laterality Date    HX GYN      HX POLYPECTOMY       Family History   Problem Relation Age of Onset    Headache Mother     Stroke Mother     Heart Disease Father     Stroke Brother     Cancer Maternal Grandmother     Heart Disease Paternal Grandfather          ROS:  Comprehensive review of systems negative except for as noted above. Objective:       Meds:  Current Outpatient Medications   Medication Sig Dispense Refill    fluticasone propionate (Flonase Allergy Relief) 50 mcg/actuation nasal spray 2 Sprays by Both Nostrils route daily.  Biotin 2,500 mcg cap Take  by mouth.  cholecalciferol (VITAMIN D3) (50,000 UNITS /1250 MCG) capsule Take  by mouth every seven (7) days.  prenatal vit-iron fumarate-fa (PRENATAL PLUS with IRON) 28 mg iron- 800 mcg tab Take 1 Tab by mouth daily.  ROSUVASTATIN CALCIUM (CRESTOR PO) Take 40 mg by mouth nightly.  levothyroxine (SYNTHROID) 25 mcg tablet Take 50 mcg by mouth Daily (before breakfast).  metoprolol succinate (TOPROL-XL) 25 mg XL tablet Take  by mouth two (2) times a day.  FEXOFENADINE HCL (ALLEGRA PO) Take  by mouth.  aspirin delayed-release 81 mg tablet Take  by mouth daily.  gabapentin (NEURONTIN) 300 mg capsule Take 1 Cap by mouth three (3) times daily. (Patient taking differently: Take 300 mg by mouth nightly.) 90 Cap 1       Exam:  Visit Vitals  Resp 18   Ht 5' 4\" (1.626 m)   BMI 34.33 kg/m²     Due to this being a TeleHealth evaluation, many elements of the physical examination are unable to be assessed. NEUROLOGICAL EXAM:  General: Awake, alert, speech fluent  CN: EOMI, VF intact, facial strength/sensation normal and symmetric, hearing is intact  Motor: AG x 4  Reflexes: deferred  Coordination: No ataxia. Sensation: LT decreased on the left. Gait: Steady      LABS  No results found for this or any previous visit.     EMG:  Impression:  Extensive electrodiagnostic evaluation of the right lower extremity with additional nerve conduction studies of the left lower extremity reveal the followin. Absent sural and superficial peroneal sensory responses in both lower extremities. Findings may be normal for this age group or associated with technical factors given preserved large fiber sensory function on examination and normal needle examination. 2. No evidence of a right lumbosacral motor radiculopathy. Assessment:     Encounter Diagnoses     ICD-10-CM ICD-9-CM   1. Radicular pain in left arm M79.2 729.2   2. Cervicalgia M54.2 723.1   3. Chronic right-sided low back pain with right-sided sciatica M54.41 724.2    G89.29 724.3     338.29      75year old female here for f/u after 3 years due to new symptoms of LUE radicular pain/paresthesias and cervicalgia x 3-4 months. Suspect this may be related to a left cervical radiculopathy, as symptoms are exacerbated by certain position of the neck/head. Will proceed with electrodiagnostic testing to further evaluation. Will decide on need for cervical imaging after studies are completed. Her more chronic LBP/RLE radicular symptoms from  are also more bothersome lately. She underwent EMG  without significant abnormalities/radiculopathy. Prior MRI Lumbar spine 3/2012 showed evidence of multilevel degenerative disc disease without significant canal or NFS. She may require repeat lumbar imaging to assess for interval worsening of degenerative changes previously identified, but will defer until after above testing is completed. Regarding her neuropathic pain symptoms, discussed titration of her gabapentin to TID dosing. She is aware of potential for sedation/fatigue on medication. Will also provide a short steroid taper to see if this helps with her LUE radicular pain/paresthesias. She was advised to avoid taking NSAIDs while completing medrol dose pack.     Plan:   EMG LUE r/o cervical radiculopathy  Medrol dose pack   Titrate gabapentin to 300mg TID for neuropathic pain symptoms  F/U after testing is completed      Signed:  Nina Ramirez DO  6/1/2020

## 2020-06-03 ENCOUNTER — OFFICE VISIT (OUTPATIENT)
Dept: NEUROLOGY | Age: 65
End: 2020-06-03

## 2020-06-03 VITALS
WEIGHT: 200 LBS | HEIGHT: 64 IN | BODY MASS INDEX: 34.15 KG/M2 | OXYGEN SATURATION: 98 % | SYSTOLIC BLOOD PRESSURE: 103 MMHG | DIASTOLIC BLOOD PRESSURE: 70 MMHG | HEART RATE: 82 BPM | RESPIRATION RATE: 16 BRPM

## 2020-06-03 DIAGNOSIS — G56.02 CARPAL TUNNEL SYNDROME OF LEFT WRIST: Primary | ICD-10-CM

## 2020-06-03 NOTE — PROGRESS NOTES
[address] [facility] Neurology [facility]  [address] [facility], [address] [place] [address]  Phone [phone] Fax [phone]  Test Date:  6/3/2020    Patient: Misti Prakash : 1955 Physician: Jacki Church DO   Sex: Female Height: 5' 4\" Ref Phys: Jacki Church DO   ID#: [id number] Weight: 200 lbs. Technician: Arlene Guaman. .EMG TECH     Patient Complaints:  LUE radicular pain/paresthesias and cervicalgia x 3-4 months    NCV & EMG Findings:  Evaluation of the left median motor nerve showed prolonged distal onset latency (4.6 ms). The left median sensory nerve showed prolonged distal peak latency (4.0 ms) and decreased conduction velocity (Wrist-2nd Digit, 35 m/s). The left median/ulnar (palm) comparison nerve showed prolonged distal peak latency (Median Palm, 2.9 ms) and abnormal peak latency difference (Median Palm-Ulnar Palm, 1.3 ms). All remaining nerves  were within normal limits. All F Wave latencies were within normal limits. All examined muscles (as indicated in the following table) showed no evidence of electrical instability. Impression:  Extensive electrodiagnostic examination of the left upper extremity and additional nerve conduction studies of the right upper extremity reveal changes most consistent with the followin. A left median neuropathy at or distal to the wrist, consistent with a clinical diagnosis of carpal tunnel syndrome, mild in degree electrically. 2. No evidence of a left cervical motor radiculopathy. ___________________________  Tarsalina Tana Church, DO        Nerve Conduction Studies  Anti Sensory Summary Table     Stim Site NR Peak (ms) Norm Peak (ms) P-T Amp (µV) Norm P-T Amp Onset (ms) Site1 Site2 Delta-P (ms) Dist (cm) Shade (m/s) Norm Shade (m/s)   Left Median Anti Sensory (2nd Digit)  32°C   Wrist    4.0 <3.6 12.4 >10 3.3 Wrist 2nd Digit 4.0 14.0 35 >39   Right Median Anti Sensory (2nd Digit)  32°C Wrist    3.6 <3.6 27.3 >10 3.2 Wrist 2nd Digit 3.6 14.0 39 >39   Left Radial Anti Sensory (Base 1st Digit)  32°C   Wrist    1.8 <3.1 54.6  1.3 Wrist Base 1st Digit 1.8 10.0 56    Left Ulnar Anti Sensory (5th Digit)  32°C   Wrist    3.3 <3.7 23.7 >15.0 2.8 Wrist 5th Digit 3.3 14.0 42 >38     Motor Summary Table     Stim Site NR Onset (ms) Norm Onset (ms) O-P Amp (mV) Norm O-P Amp Site1 Site2 Delta-0 (ms) Dist (cm) Shade (m/s) Norm Shade (m/s)   Left Median Motor (Abd Poll Brev)  32°C   Wrist    4.6 <4.2 13.1 >5 Elbow Wrist 3.8 20.0 53 >50   Elbow    8.4  11.7          Right Median Motor (Abd Poll Brev)  32°C   Wrist    3.9 <4.2 11.1 >5 Elbow Wrist 3.8 21.0 55 >50   Elbow    7.7  9.9          Left Ulnar Motor (Abd Dig Minimi)  32°C   Wrist    2.9 <4.2 10.4 >3 B Elbow Wrist 3.6 21.0 58 >53   B Elbow    6.5  8.6  A Elbow B Elbow 1.8 10.0 56 >53   A Elbow    8.3  8.3            Comparison Summary Table     Stim Site NR Peak (ms) Norm Peak (ms) P-T Amp (µV) Site1 Site2 Delta-P (ms) Norm Delta (ms)   Left Median/Ulnar Palm Comparison (Wrist - 8cm)  32°C   Median Palm    2.9 <2.5 17.5 Median Palm Ulnar Palm 1.3 <0.3   Ulnar Palm    1.6 <2.5 16.6         F Wave Studies     NR F-Lat (ms) Lat Norm (ms) L-R F-Lat (ms) L-R Lat Norm   Left Ulnar (Mrkrs) (Abd Dig Min)  32°C      26.58 <36  <2.5     EMG     Side Muscle Nerve Root Ins Act Fibs Psw Amp Dur Poly Recrt Int Pat Comment   Left 1stDorInt Ulnar C8-T1 Nml Nml Nml Nml Nml 0 Nml Nml    Left FlexPolLong Median (Ant Int) C7-8 Nml Nml Nml Nml Nml 0 Nml Nml    Left Ext Indicis Radial (Post Int) C7-8 Nml Nml Nml Nml Nml 0 Nml Nml    Left Biceps Musculocut C5-6 Nml Nml Nml Nml Nml 0 Nml Nml    Left Triceps Radial C6-7-8 Nml Nml Nml Nml Nml 0 Nml Nml    Left Deltoid Axillary C5-6 Nml Nml Nml Nml Nml 0 Nml Nml    Left Abd Poll Brev Median C8-T1 Nml Nml Nml Nml Nml 0 Nml Nml          Waveforms: Name band;

## 2020-08-20 ENCOUNTER — HOSPITAL ENCOUNTER (OUTPATIENT)
Dept: CT IMAGING | Age: 65
Discharge: HOME | End: 2020-08-20
Attending: FAMILY MEDICINE
Payer: COMMERCIAL

## 2020-08-20 ENCOUNTER — HOSPITAL ENCOUNTER (OUTPATIENT)
Dept: NON INVASIVE DIAGNOSTICS | Age: 65
Discharge: HOME | End: 2020-08-20
Attending: FAMILY MEDICINE
Payer: COMMERCIAL

## 2020-08-20 ENCOUNTER — APPOINTMENT (OUTPATIENT)
Dept: CT IMAGING | Age: 65
End: 2020-08-20
Attending: FAMILY MEDICINE
Payer: COMMERCIAL

## 2020-08-20 ENCOUNTER — HOSPITAL ENCOUNTER (OUTPATIENT)
Dept: VASCULAR SURGERY | Age: 65
Discharge: HOME | End: 2020-08-20
Attending: FAMILY MEDICINE
Payer: COMMERCIAL

## 2020-08-20 VITALS — WEIGHT: 200 LBS | BODY MASS INDEX: 34.15 KG/M2 | HEIGHT: 64 IN

## 2020-08-20 DIAGNOSIS — H81.10 BENIGN PAROXYSMAL POSITIONAL VERTIGO: ICD-10-CM

## 2020-08-20 DIAGNOSIS — I51.9 MYXEDEMA HEART DISEASE: ICD-10-CM

## 2020-08-20 DIAGNOSIS — R52 SCAR PAINFUL: ICD-10-CM

## 2020-08-20 DIAGNOSIS — R53.83 FATIGUE: ICD-10-CM

## 2020-08-20 DIAGNOSIS — E03.9 MYXEDEMA HEART DISEASE: ICD-10-CM

## 2020-08-20 DIAGNOSIS — R55 SYNCOPE AND COLLAPSE: ICD-10-CM

## 2020-08-20 DIAGNOSIS — R20.9 DISTURBANCE OF SKIN SENSATION: ICD-10-CM

## 2020-08-20 DIAGNOSIS — L90.5 SCAR PAINFUL: ICD-10-CM

## 2020-08-20 LAB
ECHO AO ROOT DIAM: 2.88 CM
ECHO AR MAX VEL PISA: 403.89 CM/S
ECHO AV AREA PEAK VELOCITY: 1.77 CM2
ECHO AV AREA/BSA PEAK VELOCITY: 0.9 CM2/M2
ECHO AV PEAK GRADIENT: 8.11 MMHG
ECHO AV PEAK VELOCITY: 142.42 CM/S
ECHO AV REGURGITANT PHT: 0.64 S
ECHO LA AREA 4C: 22.25 CM2
ECHO LA VOL 2C: 92.33 ML (ref 22–52)
ECHO LA VOL 4C: 66.33 ML (ref 22–52)
ECHO LA VOL BP: 86.49 ML (ref 22–52)
ECHO LA VOL/BSA BIPLANE: 44.22 ML/M2 (ref 16–28)
ECHO LA VOLUME INDEX A2C: 47.2 ML/M2 (ref 16–28)
ECHO LA VOLUME INDEX A4C: 33.91 ML/M2 (ref 16–28)
ECHO LV EDV A2C: 100.9 ML
ECHO LV EDV A4C: 68.81 ML
ECHO LV EDV BP: 84.11 ML (ref 56–104)
ECHO LV EDV INDEX A4C: 35.2 ML/M2
ECHO LV EDV INDEX BP: 43 ML/M2
ECHO LV EDV NDEX A2C: 51.6 ML/M2
ECHO LV EJECTION FRACTION A2C: 64 PERCENT
ECHO LV EJECTION FRACTION A4C: 65 PERCENT
ECHO LV EJECTION FRACTION BIPLANE: 64.7 PERCENT (ref 55–100)
ECHO LV ESV A2C: 36.06 ML
ECHO LV ESV A4C: 24.14 ML
ECHO LV ESV BP: 29.71 ML (ref 19–49)
ECHO LV ESV INDEX A2C: 18.4 ML/M2
ECHO LV ESV INDEX A4C: 12.3 ML/M2
ECHO LV ESV INDEX BP: 15.2 ML/M2
ECHO LV INTERNAL DIMENSION DIASTOLIC: 4.41 CM (ref 3.9–5.3)
ECHO LV INTERNAL DIMENSION SYSTOLIC: 3 CM
ECHO LV IVSD: 1.13 CM (ref 0.6–0.9)
ECHO LV MASS 2D: 168.4 G (ref 67–162)
ECHO LV MASS INDEX 2D: 86.1 G/M2 (ref 43–95)
ECHO LV POSTERIOR WALL DIASTOLIC: 1.06 CM (ref 0.6–0.9)
ECHO LVOT DIAM: 1.88 CM
ECHO LVOT PEAK GRADIENT: 3.33 MMHG
ECHO LVOT PEAK VELOCITY: 91.2 CM/S
ECHO MV A VELOCITY: 75.36 CM/S
ECHO MV AREA PHT: 4.15 CM2
ECHO MV E DECELERATION TIME (DT): 0.18 S
ECHO MV E VELOCITY: 111.62 CM/S
ECHO MV E/A RATIO: 1.48
ECHO MV EROA PISA: 0.07 CM2
ECHO MV PRESSURE HALF TIME (PHT): 0.05 S
ECHO MV REGURGITANT RADIUS PISA: 0.41 CM
ECHO MV REGURGITANT VOLUME: 16.19 ML
ECHO MV REGURGITANT VTIA: 226.08 CM
ECHO PV PEAK INSTANTANEOUS GRADIENT SYSTOLIC: 2.4 MMHG
ECHO PV REGURGITANT MAX VELOCITY: 559.9 CM/S
ECHO RV INTERNAL DIMENSION: 3.12 CM
ECHO RV TAPSE: 1.98 CM (ref 1.5–2)
ECHO TV REGURGITANT MAX VELOCITY: 275.07 CM/S
ECHO TV REGURGITANT PEAK GRADIENT: 30.27 MMHG

## 2020-08-20 PROCEDURE — 93880 EXTRACRANIAL BILAT STUDY: CPT

## 2020-08-20 PROCEDURE — 71250 CT THORAX DX C-: CPT

## 2020-08-20 PROCEDURE — 93306 TTE W/DOPPLER COMPLETE: CPT

## 2020-08-20 PROCEDURE — 70450 CT HEAD/BRAIN W/O DYE: CPT

## 2020-08-21 LAB
LEFT CCA DIST DIAS: 19.3 CM/S
LEFT CCA DIST SYS: 73.2 CM/S
LEFT CCA PROX DIAS: 13.8 CM/S
LEFT CCA PROX SYS: 71 CM/S
LEFT ECA DIAS: 14.9 CM/S
LEFT ECA SYS: 74.3 CM/S
LEFT ICA DIST DIAS: 27.5 CM/S
LEFT ICA DIST SYS: 79.4 CM/S
LEFT ICA MID DIAS: 27 CM/S
LEFT ICA MID SYS: 78.6 CM/S
LEFT ICA PROX DIAS: 20.4 CM/S
LEFT ICA PROX SYS: 83.1 CM/S
LEFT ICA/CCA SYS: 1.1
LEFT VERTEBRAL DIAS: 13.1 CM/S
LEFT VERTEBRAL SYS: 58.2 CM/S
RIGHT CCA DIST DIAS: 19 CM/S
RIGHT CCA DIST SYS: 62 CM/S
RIGHT CCA PROX DIAS: 11.2 CM/S
RIGHT CCA PROX SYS: 73.8 CM/S
RIGHT ECA DIAS: 5 CM/S
RIGHT ECA SYS: 63.4 CM/S
RIGHT ICA DIST DIAS: 24.7 CM/S
RIGHT ICA DIST SYS: 77.7 CM/S
RIGHT ICA MID DIAS: 26.8 CM/S
RIGHT ICA MID SYS: 76.1 CM/S
RIGHT ICA PROX DIAS: 18.8 CM/S
RIGHT ICA PROX SYS: 69.9 CM/S
RIGHT ICA/CCA SYS: 1.3
RIGHT VERTEBRAL DIAS: 21.5 CM/S
RIGHT VERTEBRAL SYS: 66.6 CM/S

## 2020-09-18 ENCOUNTER — VIRTUAL VISIT (OUTPATIENT)
Dept: SLEEP MEDICINE | Age: 65
End: 2020-09-18
Payer: COMMERCIAL

## 2020-09-18 DIAGNOSIS — E07.9 THYROID DISEASE: ICD-10-CM

## 2020-09-18 DIAGNOSIS — G47.33 OSA (OBSTRUCTIVE SLEEP APNEA): Primary | ICD-10-CM

## 2020-09-18 DIAGNOSIS — I48.91 ATRIAL FIBRILLATION, UNSPECIFIED TYPE (HCC): ICD-10-CM

## 2020-09-18 DIAGNOSIS — U07.1 COVID-19: ICD-10-CM

## 2020-09-18 PROCEDURE — G8419 CALC BMI OUT NRM PARAM NOF/U: HCPCS | Performed by: INTERNAL MEDICINE

## 2020-09-18 PROCEDURE — G8536 NO DOC ELDER MAL SCRN: HCPCS | Performed by: INTERNAL MEDICINE

## 2020-09-18 PROCEDURE — 3017F COLORECTAL CA SCREEN DOC REV: CPT | Performed by: INTERNAL MEDICINE

## 2020-09-18 PROCEDURE — 1101F PT FALLS ASSESS-DOCD LE1/YR: CPT | Performed by: INTERNAL MEDICINE

## 2020-09-18 PROCEDURE — 99214 OFFICE O/P EST MOD 30 MIN: CPT | Performed by: INTERNAL MEDICINE

## 2020-09-18 PROCEDURE — G8432 DEP SCR NOT DOC, RNG: HCPCS | Performed by: INTERNAL MEDICINE

## 2020-09-18 PROCEDURE — G8399 PT W/DXA RESULTS DOCUMENT: HCPCS | Performed by: INTERNAL MEDICINE

## 2020-09-18 PROCEDURE — G8427 DOCREV CUR MEDS BY ELIG CLIN: HCPCS | Performed by: INTERNAL MEDICINE

## 2020-09-18 PROCEDURE — 1090F PRES/ABSN URINE INCON ASSESS: CPT | Performed by: INTERNAL MEDICINE

## 2020-09-18 RX ORDER — MECLIZINE HCL 12.5 MG 12.5 MG/1
TABLET ORAL
COMMUNITY
Start: 2020-09-11

## 2020-09-18 RX ORDER — PANTOPRAZOLE SODIUM 40 MG/1
TABLET, DELAYED RELEASE ORAL
COMMUNITY
Start: 2020-09-11

## 2020-09-18 RX ORDER — SCOLOPAMINE TRANSDERMAL SYSTEM 1 MG/1
PATCH, EXTENDED RELEASE TRANSDERMAL
COMMUNITY
Start: 2020-09-11

## 2020-09-18 RX ORDER — DIAZEPAM 2 MG/1
TABLET ORAL
COMMUNITY
Start: 2020-09-11

## 2020-09-18 NOTE — PROGRESS NOTES
[address] [place]. [place], [address]  Tel.  [phone]  Fax. 100 [facility] 60  [place], [address] [facility]  Tel.  [phone]  Fax. [phone] [address] [facility] Min[address]a Dominguez   Tel.  [phone]  Fax. [phone]       S>    Horace Levine is a 75 y.o. female who was seen by synchronous (real-time) audio-video technology on 9/18/2020. Consent:  She and/or her healthcare decision maker is aware that this patient-initiated Telehealth encounter is a billable service, with coverage as determined by her insurance carrier. She is aware that she may receive a bill and has provided verbal consent to proceed: Yes    I was at home while conducting this encounter. Patient verified with 's License. She reports problems using the device. She is 43% compliant over the past 30 days. The following problems are identified:    Drowsiness no Problems exhaling yes   Snoring no Forget to put on no   Mask Comfortable yes Can't fall asleep no   Dry Mouth no Mask falls off no   Air Leaking no Frequent awakenings no       She admits that her sleep has not improved on PAP therapy. She reports of difficulties tolerating the current pressure settings and discontinues therapy. She reports of a recent hospitalization for evaluation of vertigo a small growth was noted in the proximity of her auditory structure and she is due to be evaluated by neurosurgery. No Known Allergies    She has a current medication list which includes the following prescription(s): meclizine, diazepam, pantoprazole, scopolamine, fluticasone propionate, biotin, cholecalciferol, prenatal vit-iron fumarate-fa, methylprednisolone, gabapentin, rosuvastatin calcium, levothyroxine, metoprolol succinate, fexofenadine hcl, and aspirin delayed-release. .      She  has a past medical history of Atrial fibrillation ([address] Hyperlipemia, Migraine, Sleep apnea, and Thyroid disease.     Maroa Sleepiness Score: 10   and Modified F.O.S.Q. Score Total / 2: 18.5      O>        Patient-Reported Vitals 9/18/2020   Patient-Reported Weight 200   Patient-Reported Temperature 97.7   Patient-Reported Systolic  757   Patient-Reported Diastolic 77       Physical Exam completed by visual and auditory observation of patient with verbal input from patient. General:   Alert, oriented, not in acute distress   Eyes:  Anicteric Sclerae; no obvious strabismus   Nose:  No obvious nasal septum deviation    Neck:   Midline trachea, no visible mass   Chest/Lungs:  Respiratory effort normal, no visualized signs of difficulty breathing or respiratory distress   CVS:  No JVD   Extremities:  No obvious rashes noted on face, neck, or hands   Neuro:  No facial asymmetry, no focal deficits; no obvious tremor    Psych:  Normal affect,  normal countenance         A>    ICD-10-CM ICD-9-CM    1. GILDA (obstructive sleep apnea)  G47.33 327.23 SLEEP LAB (PAP TITRATION)   2. Atrial fibrillation, unspecified type ([facility]  I48.91 427.31    3. Thyroid disease  E07.9 246.9    4. BMI 34.0-34.9,adult  Z68.34 V85.34    5. COVID-19  U07.1 079.89 NOVEL CORONAVIRUS (COVID-19)     AHI = 17.8 (2020). On Resmed :  APAP 4-8 cmH2O. Compliant:      no    Therapeutic Response:  Positive    P>    * Patient agrees to COVID and Attended Titration due to CPAP Failure.     Orders Placed This Encounter    NOVEL CORONAVIRUS (COVID-19)     Standing Status:   Future     Standing Expiration Date:   12/18/2020     Scheduling Instructions:      1) Due to current limited availability of the COVID-19 PCR test, tests will be prioritized and may not be completed.              2) Order only if the test result will change clinical management or necessary for a return to mission-critical employment decision.              3) Print and instruct patient to adhere to [facility] home isolation program. (Link Above)              4) Set up or refer patient for a monitoring program.              5) Have patient sign up for and leverage MyChart (if not previously done). Order Specific Question:   Status     Answer:   Asymptomatic/Surveillance(e.g. pre-op/pre-procedure/pre-delivery/transfer)     Order Specific Question:   Reason for Test     Answer:   Upcoming elective surgery/procedure/delivery, return to work, or discharge to another facility    SLEEP LAB (PAP TITRATION)     Standing Status:   Future     Standing Expiration Date:   12/18/2020     Scheduling Instructions:      Perform Bi-level Titration. Order Specific Question:   Reason for Exam     Answer:   sydney       * We have recommended a dedicated weight loss through appropriate diet and an exercise regiment as significant weight reduction has been shown to reduce severity of obstructive sleep apnea. *   Follow-up and Dispositions    · Return if symptoms worsen or fail to improve. * She was asked to contact our office for any problems regarding PAP therapy. * Counseling was provided regarding the importance of regular PAP use and on proper sleep hygiene and safe driving. * Re-enforced proper and regular cleaning for the device. Thank you for allowing us to participate in your patient's medical care. Pursuant to the emergency declaration under the [facility] waiver authority and the [employer] General Act, this Virtual  Visit was conducted, with patient's consent, to reduce the patient's risk of exposure to COVID-19 and provide continuity of care for an established patient. Services were provided through a video synchronous discussion virtually to substitute for in-person clinic visit. Jhonathan Prasad MD, FAASM  Electronically signed.  09/18/20

## 2020-09-18 NOTE — PATIENT INSTRUCTIONS
[address] [facility] [place]. [address] [facility], [address] Tel.  [phone] Fax. 100 [facility] 60 [place], [address] Tel.  [phone] Fax. [phone] [address] [place] Dominguez Tel.  [phone] Fax. [phone] Learning About CPAP for Sleep Apnea What is CPAP? CPAP is a small machine that you use at home every night while you sleep. It increases air pressure in your throat to keep your airway open. When you have sleep apnea, this can help you sleep better so you feel much better. CPAP stands for \"continuous positive airway pressure. \" The CPAP machine will have one of the following: · A mask that covers your nose and mouth · Prongs that fit into your nose · A mask that covers your nose only, the most common type. This type is called NCPAP. The N stands for \"nasal.\" Why is it done? CPAP is usually the best treatment for obstructive sleep apnea. It is the first treatment choice and the most widely used. Your doctor may suggest CPAP if you have: · Moderate to severe sleep apnea. · Sleep apnea and coronary artery disease (CAD) or heart failure. How does it help? · CPAP can help you have more normal sleep, so you feel less sleepy and more alert during the daytime. · CPAP may help keep heart failure or other heart problems from getting worse. · NCPAP may help lower your blood pressure. · If you use CPAP, your bed partner may also sleep better because you are not snoring or restless. What are the side effects? Some people who use CPAP have: · A dry or stuffy nose and a sore throat. · Irritated skin on the face. · Sore eyes. · Bloating. If you have any of these problems, work with your doctor to fix them. Here are some things you can try: · Be sure the mask or nasal prongs fit well. · See if your doctor can adjust the pressure of your CPAP. · If your nose is dry, try a humidifier. · If your nose is runny or stuffy, try decongestant medicine or a steroid nasal spray. If these things do not help, you might try a different type of machine. Some machines have air pressure that adjusts on its own. Others have air pressures that are different when you breathe in than when you breathe out. This may reduce discomfort caused by too much pressure in your nose. Where can you learn more? Go to CrowdChat.be Enter U207 in the search box to learn more about \"Learning About CPAP for Sleep Apnea. \"  
© 1988-3670 Healthwise, Incorporated. Care instructions adapted under license by [address] [place] [address] (which disclaims liability or warranty for this information). This care instruction is for use with your licensed healthcare professional. If you have questions about a medical condition or this instruction, always ask your healthcare professional. [address] any warranty or liability for your use of this information. Content Version: 2.2.46406; Last Revised: January 11, 2010 PROPER SLEEP HYGIENE What to avoid · Do not have drinks with caffeine, such as coffee or black tea, for 8 hours before bed. · Do not smoke or use other types of tobacco near bedtime. Nicotine is a stimulant and can keep you awake. · Avoid drinking alcohol late in the evening, because it can cause you to wake in the middle of the night. · Do not eat a big meal close to bedtime. If you are hungry, eat a light snack. · Do not drink a lot of water close to bedtime, because the need to urinate may wake you up during the night. · Do not read or watch TV in bed. Use the bed only for sleeping and sexual activity. What to try · Go to bed at the same time every night, and wake up at the same time every morning. Do not take naps during the day. · Keep your bedroom quiet, dark, and cool. · Get regular exercise, but not within 3 to 4 hours of your bedtime. Teddy Payton · Sleep on a comfortable pillow and mattress. · If watching the clock makes you anxious, turn it facing away from you so you cannot see the time. · If you worry when you lie down, start a worry book. Well before bedtime, write down your worries, and then set the book and your concerns aside. · Try meditation or other relaxation techniques before you go to bed. · If you cannot fall asleep, get up and go to another room until you feel sleepy. Do something relaxing. Repeat your bedtime routine before you go to bed again. · Make your house quiet and calm about an hour before bedtime. Turn down the lights, turn off the TV, log off the computer, and turn down the volume on music. This can help you relax after a busy day. Drowsy Driving: The [facility] 54 cites drowsiness as a causing factor in more than 392,166 police reported crashes annually, resulting in 76,000 injuries and 1,500 deaths. Other surveys suggest 55% of people polled have driven while drowsy in the past year, 23% had fallen asleep but not crashed, 3% crashed, and 2% had and accident due to drowsy driving. Who is at risk? Young Drivers: One study of drowsy driving accidents states that 55% of the drivers were under 25 years. Of those, 75% were male. Shift Workers and Travelers: People who work overnight or travel across time zones frequently are at higher risk of experiencing Circadian Rhythm Disorders. They are trying to work and function when their body is programed to sleep. Sleep Deprived: Lack of sleep has a serious impact on your ability to pay attention or focus on a task. Consistently getting less than the average of 8 hours your body needs creates partial or cumulative sleep deprivation.   
Untreated Sleep Disorders: Sleep Apnea, Narcolepsy, R.L.S., and other sleep disorders (untreated) prevent a person from getting enough restful sleep. This leads to excessive daytime sleepiness and increases the risk for drowsy driving accidents by up to 7 times. Medications / Alcohol: Even over the counter medications can cause drowsiness. Medications that impair a drivers attention should have a warning label. Alcohol naturally makes you sleepy and on its own can cause accidents. Combined with excessive drowsiness its effects are amplified. Signs of Drowsy Driving: * You don't remember driving the last few miles * You may drift out of your jo ann * You are unable to focus and your thoughts wander * You may yawn more often than normal 
 * You have difficulty keeping your eyes open / nodding off * Missing traffic signs, speeding, or tailgating Prevention-  
Good sleep hygiene, lifestyle and behavioral choices have the most impact on drowsy driving. There is no substitute for sleep and the average person requires 8 hours nightly. If you find yourself driving drowsy, stop and sleep. Consider the sleep hygiene tips provided during your visit as well. Medication Refill Policy: Refills for all medications require 1 week advance notice. Please have your pharmacy fax a refill request. We are unable to fax, or call in \"controled substance\" medications and you will need to pick these prescriptions up from our office. [employer] Activation Thank you for requesting access to [employer]. Please follow the instructions below to securely access and download your online medical record. [employer] allows you to send messages to your doctor, view your test results, renew your prescriptions, schedule appointments, and more. How Do I Sign Up? 1. In your internet browser, go to [email or web address]. 2. Click on the First Time User? Click Here link in the Sign In box. You will see the New Member Sign Up page. 3. Enter your [employer] Access Code exactly as it appears below.  You will not need to use this code after youve completed the sign-up process. If you do not sign up before the expiration date, you must request a new code. SDI-Solution Access Code: Activation code not generated Current SDI-Solution Status: Active (This is the date your SDI-Solution access code will ) 4. Enter the last four digits of your Social Security Number (xxxx) and Date of Birth (mm/dd/yyyy) as indicated and click Submit. You will be taken to the next sign-up page. 5. Create a Neocist ID. This will be your SDI-Solution login ID and cannot be changed, so think of one that is secure and easy to remember. 6. Create a SDI-Solution password. You can change your password at any time. 7. Enter your Password Reset Question and Answer. This can be used at a later time if you forget your password. 8. Enter your e-mail address. You will receive e-mail notification when new information is available in [address]. 9. Click Sign Up. You can now view and download portions of your medical record. 10. Click the Download Summary menu link to download a portable copy of your medical information. Additional Information If you have questions, please call [phone]. Remember, SDI-Solution is NOT to be used for urgent needs. For medical emergencies, dial 911.

## 2020-12-07 ENCOUNTER — TELEPHONE (OUTPATIENT)
Dept: SLEEP MEDICINE | Age: 65
End: 2020-12-07

## 2020-12-07 ENCOUNTER — DOCUMENTATION ONLY (OUTPATIENT)
Dept: SLEEP MEDICINE | Age: 65
End: 2020-12-07

## 2020-12-07 DIAGNOSIS — G47.33 OSA (OBSTRUCTIVE SLEEP APNEA): Primary | ICD-10-CM

## 2020-12-07 DIAGNOSIS — U07.1 COVID-19: ICD-10-CM

## 2020-12-07 NOTE — PROGRESS NOTES
Patient LVM stating she was just discharge from hospital and still feeling \"weak\" she request for COVID Testing and sleep study to be cancelled and r/s.  Returned patient call and LVM for patient to call office to r/s

## 2020-12-07 NOTE — TELEPHONE ENCOUNTER
Patient's Titration sleep study and COVID Test has been rescheduled due to patient being ill and just discharged from hospital. New order for both is needed.

## 2020-12-08 DIAGNOSIS — U07.1 COVID-19: ICD-10-CM

## 2020-12-08 NOTE — TELEPHONE ENCOUNTER
Orders Placed This Encounter    NOVEL CORONAVIRUS (COVID-19)     Standing Status:   Future     Standing Expiration Date:   3/8/2021     Scheduling Instructions:      1) Due to current limited availability of the COVID-19 PCR test, tests will be prioritized and may not be completed.              2) Order only if the test result will change clinical management or necessary for a return to mission-critical employment decision.              3) Print and instruct patient to adhere to [facility] home isolation program. (Link Above)              4) Set up or refer patient for a monitoring program.              5) Have patient sign up for and leverage [employer]art (if not previously done). Order Specific Question:   Status     Answer:   Asymptomatic/Surveillance(e.g. pre-op/pre-procedure/pre-delivery/transfer)     Order Specific Question:   Reason for Test     Answer:   Upcoming elective surgery/procedure/delivery, return to work, or discharge to another facility    SLEEP LAB (PAP TITRATION)     Standing Status:   Future     Standing Expiration Date:   3/8/2021     Scheduling Instructions:      Perform Bi-level Titration.      Order Specific Question:   Reason for Exam     Answer:   GILDA

## 2020-12-16 ENCOUNTER — TELEPHONE (OUTPATIENT)
Dept: SLEEP MEDICINE | Age: 65
End: 2020-12-16

## 2020-12-16 NOTE — TELEPHONE ENCOUNTER
Patient called wanted us to resend the paperwork for covid testing and in Lab sleep study info, MAILED

## 2020-12-31 ENCOUNTER — OFFICE VISIT (OUTPATIENT)
Dept: SLEEP MEDICINE | Age: 65
End: 2020-12-31

## 2020-12-31 ENCOUNTER — HOSPITAL ENCOUNTER (OUTPATIENT)
Dept: LAB | Age: 65
Discharge: HOME | End: 2020-12-31
Payer: COMMERCIAL

## 2020-12-31 DIAGNOSIS — G47.33 OSA (OBSTRUCTIVE SLEEP APNEA): Primary | ICD-10-CM

## 2020-12-31 DIAGNOSIS — I48.91 ATRIAL FIBRILLATION, UNSPECIFIED TYPE (HCC): ICD-10-CM

## 2020-12-31 PROCEDURE — 87635 SARS-COV-2 COVID-19 AMP PRB: CPT

## 2020-12-31 PROCEDURE — U0005 INFEC AGEN DETEC AMPLI PROBE: HCPCS

## 2021-01-02 LAB — SARS-COV-2, NAA: NOT DETECTED

## 2021-01-07 ENCOUNTER — HOSPITAL ENCOUNTER (OUTPATIENT)
Dept: SLEEP MEDICINE | Age: 66
Discharge: HOME | End: 2021-01-07
Payer: COMMERCIAL

## 2021-01-07 DIAGNOSIS — G47.33 OSA (OBSTRUCTIVE SLEEP APNEA): ICD-10-CM

## 2021-01-07 PROCEDURE — 95811 POLYSOM 6/>YRS CPAP 4/> PARM: CPT | Performed by: INTERNAL MEDICINE

## 2021-01-08 VITALS
SYSTOLIC BLOOD PRESSURE: 127 MMHG | DIASTOLIC BLOOD PRESSURE: 76 MMHG | OXYGEN SATURATION: 93 % | TEMPERATURE: 97.6 F | HEART RATE: 79 BPM | WEIGHT: 209 LBS | BODY MASS INDEX: 35.68 KG/M2 | HEIGHT: 64 IN

## 2021-01-08 NOTE — PROGRESS NOTES
[address] [place]. [address]   [address]  Tel.  [phone]  Fax. [phone] [address] [place]. [address]  [place] [address]  Tel.  [phone]  Fax. [phone] [address] [place] [address]  Tel.  [phone]  Fax. [phone]     Sleep Study Technical Notes        PRE-Test:  Carol Hutchinson (: 1955) arrived in the lobby. Patient was greeted, temperature checked (97.6°) and screening questions asked. The patient was taken directly to her room. BP (127/76) and SpO2 (93%) were recorded. Scale currently not available. Procedure explained to patient and questions were answered. The patient expressed understanding. Electrodes were applied without incident. The patient was placed in bed and the study was started.    Acquisition Notes: BIPAP was started at 8/4cm H2O and was titrated to 10/4cm H2O for hypopneas. The patient was able to enter REM, and respiratory events were eliminated at the final pressure.    Lights off: 10:12   Respiratory events: hypopneas   ECG:  normal  BIPAP titration: 10/4cm H2O  Desensitization Mask(s) Used: N30-medium    POST Test:  Patient was awakened.  Electrodes were removed.  The patient was discharged after answering the Post Questionnaire.  Patient stated that she was alert and ok. Her ride came to pick her up.  Equipment and room cleaned per infection control policy.    •

## 2021-01-18 ENCOUNTER — TELEPHONE (OUTPATIENT)
Dept: SLEEP MEDICINE | Age: 66
End: 2021-01-18

## 2021-01-18 DIAGNOSIS — Z11.59 SCREENING FOR VIRAL DISEASE: ICD-10-CM

## 2021-01-18 DIAGNOSIS — G47.31 COMPLEX SLEEP APNEA SYNDROME: Primary | ICD-10-CM

## 2021-01-18 NOTE — TELEPHONE ENCOUNTER
Ladan Nunes is to be contacted by lead sleep technologist regarding results of Sleep Testing which was indicative of an average AHI of 7.6 per hour on PAP Therapy. A total of 34 events were scored with 26 being central apnea. Emergence of Central Apnea noted at pressures adequate enough to control obstructive apnea. * A second polysomnogram has been ordered for mask fitting, PAP desensitizing protocol, and pressure titration. * Follow-up appointment will be scheduled 8-12 weeks following PAP initiation to gauge treatment response and compliance. Encounter Diagnoses   Name Primary?  Complex sleep apnea syndrome Yes    Screening for viral disease        Orders Placed This Encounter    NOVEL CORONAVIRUS (COVID-19)     Standing Status:   Future     Standing Expiration Date:   4/18/2021     Scheduling Instructions:      1) Due to current limited availability of the COVID-19 PCR test, tests will be prioritized and may not be completed.              2) Order only if the test result will change clinical management or necessary for a return to mission-critical employment decision.              3) Print and instruct patient to adhere to [facility] home isolation program. (Link Above)              4) Set up or refer patient for a monitoring program.              5) Have patient sign up for and leverage [employer] (if not previously done). Order Specific Question:   Status     Answer:   Asymptomatic/Surveillance(e.g. pre-op/pre-procedure/pre-delivery/transfer)     Order Specific Question:   Reason for Test     Answer:   Upcoming elective surgery/procedure/delivery, return to work, or discharge to another facility    SLEEP LAB (PAP TITRATION)     Standing Status:   Future     Standing Expiration Date:   4/18/2021     Scheduling Instructions:      Perform ASV Titration:      Rate: Auto; Max pressure: 25 cmH2O. Maximum EPAP: 10 cmH2O; Minimum EPAP: 04 cmH2O. Maximum PS: 15 cmH2O; Minimum PS: 02 cmH2O. Order Specific Question:   Reason for Exam     Answer:   CSA

## 2021-01-19 ENCOUNTER — DOCUMENTATION ONLY (OUTPATIENT)
Dept: SLEEP MEDICINE | Age: 66
End: 2021-01-19

## 2021-01-19 NOTE — TELEPHONE ENCOUNTER
Reviewed sleep study results with patient. She expressed understanding and is willing to proceed with a CPAP Titration. Please schedule titration and COVID.     Thanks

## 2021-01-28 ENCOUNTER — OFFICE VISIT (OUTPATIENT)
Dept: SLEEP MEDICINE | Age: 66
End: 2021-01-28

## 2021-01-28 DIAGNOSIS — G47.33 OSA (OBSTRUCTIVE SLEEP APNEA): Primary | ICD-10-CM

## 2021-01-28 DIAGNOSIS — Z11.59 SCREENING FOR VIRAL DISEASE: ICD-10-CM

## 2021-01-29 LAB — SARS-COV-2, NAA: NOT DETECTED

## 2021-02-03 ENCOUNTER — HOSPITAL ENCOUNTER (OUTPATIENT)
Dept: SLEEP MEDICINE | Age: 66
Discharge: HOME | End: 2021-02-03
Attending: INTERNAL MEDICINE
Payer: COMMERCIAL

## 2021-02-03 DIAGNOSIS — G47.31 COMPLEX SLEEP APNEA SYNDROME: ICD-10-CM

## 2021-02-03 PROCEDURE — 95811 POLYSOM 6/>YRS CPAP 4/> PARM: CPT | Performed by: INTERNAL MEDICINE

## 2021-02-04 VITALS
HEART RATE: 69 BPM | WEIGHT: 200 LBS | OXYGEN SATURATION: 98 % | DIASTOLIC BLOOD PRESSURE: 74 MMHG | TEMPERATURE: 98.2 F | HEIGHT: 64 IN | SYSTOLIC BLOOD PRESSURE: 112 MMHG | BODY MASS INDEX: 34.15 KG/M2

## 2021-02-04 NOTE — PROGRESS NOTES
[address] [facility] [place]. [place], [address]  Tel.  [phone]  Fax. 100 [facility] 60  [place], [address] [facility]  Tel.  [phone]  Fax. [phone] [address]  Tel.  [phone]  Fax. [phone]     Sleep Study Technical Notes        PRE-Test:  Christal Goldberg (: 1955) arrived in the lobby. Patient was greeted, temperature checked (98.2°) and screening questions asked. The patient was taken directly to her room. BP (112/74) and SpO2 (98%) were recorded. Scale currently not available. Procedure explained to patient and questions were answered. The patient expressed understanding. Electrodes were applied slightly offset due to the patients otoniel. Acquisition Notes: ASV was started at [facility], Max pressure-25, EPAPmin/max-4/10, PSmin/max-15/2. The patient had difficulty tolerating the masks/pressure, and maintaining sleep. She stated that she couldnt use a ff mask initially, but she had nasal congestion, so she was convinced to switch to one later in the study. The patient was able to enter REM, and respiratory events were mostly eliminated at the final pressure. Lights off: 10:13pm  Respiratory events: hypopneas, OA  ECG:  normal  ASV titration: BR-auto, Max pressure-25, EPAPmin/max-4/10, PSmin/max-2/12  Desensitization Mask(s) Used: Airfit N10-medium, Simplus-medium, Airfit F20-medium    POST Test:  Patient was awakened. Electrodes were removed. The patient was discharged after answering the Post Questionnaire. Patient stated that she was alert and ok. Equipment and room cleaned per infection control policy.   

## 2021-02-18 ENCOUNTER — TELEPHONE (OUTPATIENT)
Dept: SLEEP MEDICINE | Age: 66
End: 2021-02-18

## 2021-02-18 DIAGNOSIS — G47.31 COMPLEX SLEEP APNEA SYNDROME: Primary | ICD-10-CM

## 2021-02-19 ENCOUNTER — DOCUMENTATION ONLY (OUTPATIENT)
Dept: SLEEP MEDICINE | Age: 66
End: 2021-02-19

## 2021-02-19 NOTE — TELEPHONE ENCOUNTER
Orders Placed This Encounter    AMB SUPPLY ORDER     Diagnosis: Complex Sleep Apnea G47.31    Positive Airway Pressure Therapy: Duration of need: 99 months. BiPAP autoSV  with Heated Humidifier :    Min EPAP:  6 cmH2O / Max EPAP: 12 cmH2O;  Min PS:       0 cmH2O / Max PS:     24 cmH2O;  Max Pressure:  30 cmH2O, Back up Rate: Auto;     Full Face Mask 1 every 3 months.  Full Face Mask Cushion 1 per month.  Headgear 1 every 6 months.  Tubing 1 every 3 months.  Filter(s) Non-Disposable 1 every 6 months. Ambrocio Liu MD, FAASM; NPI: [id number]  Electronically signed.  02/18/21

## 2021-03-18 ENCOUNTER — TELEPHONE (OUTPATIENT)
Dept: SLEEP MEDICINE | Age: 66
End: 2021-03-18

## 2021-03-18 NOTE — TELEPHONE ENCOUNTER
Patient called and stated she think the pressure is too high. She stated when she exhales it feels like forced air and causes her lip to curl. She can be reached at [phone].

## 2021-03-22 NOTE — TELEPHONE ENCOUNTER
Patient complained of too much pressure but just wore the device when she was watching tv. Advised to use when going to sleep and utilized the ramp feature. Ramp feature enabled remotely on device.

## 2021-04-08 ENCOUNTER — TRANSCRIBE ORDER (OUTPATIENT)
Dept: SCHEDULING | Age: 66
End: 2021-04-08

## 2021-04-08 DIAGNOSIS — M54.16 LUMBAR RADICULOPATHY: ICD-10-CM

## 2021-04-08 DIAGNOSIS — M54.50 LOW BACK PAIN: Primary | ICD-10-CM

## 2021-04-22 ENCOUNTER — HOSPITAL ENCOUNTER (OUTPATIENT)
Dept: MRI IMAGING | Age: 66
Discharge: HOME | End: 2021-04-22
Attending: FAMILY MEDICINE
Payer: COMMERCIAL

## 2021-04-22 DIAGNOSIS — M54.50 LOW BACK PAIN: ICD-10-CM

## 2021-04-22 DIAGNOSIS — M54.16 LUMBAR RADICULOPATHY: ICD-10-CM

## 2021-04-22 PROCEDURE — 72148 MRI LUMBAR SPINE W/O DYE: CPT

## 2021-04-26 ENCOUNTER — TELEPHONE (OUTPATIENT)
Dept: SLEEP MEDICINE | Age: 66
End: 2021-04-26

## 2021-04-26 NOTE — TELEPHONE ENCOUNTER
Patient called stating she is having difficulty with PAP pressures. She stated she had received ASV with nasal pillows and she was blowing bubbles and stopped using ASV and went back to using Bilevel device. Downloads of both devices indicate patient has not been compliant with either device. Patient scheduled for VV on 5/20/2021. Please try to schedule patient for a sooner VV.

## 2021-05-04 ENCOUNTER — VIRTUAL VISIT (OUTPATIENT)
Dept: SLEEP MEDICINE | Age: 66
End: 2021-05-04
Payer: COMMERCIAL

## 2021-05-04 ENCOUNTER — DOCUMENTATION ONLY (OUTPATIENT)
Dept: SLEEP MEDICINE | Age: 66
End: 2021-05-04

## 2021-05-04 DIAGNOSIS — G47.33 OSA (OBSTRUCTIVE SLEEP APNEA): ICD-10-CM

## 2021-05-04 DIAGNOSIS — G47.31 COMPLEX SLEEP APNEA SYNDROME: Primary | ICD-10-CM

## 2021-05-04 DIAGNOSIS — I48.91 ATRIAL FIBRILLATION, UNSPECIFIED TYPE (HCC): ICD-10-CM

## 2021-05-04 DIAGNOSIS — E07.9 THYROID DISEASE: ICD-10-CM

## 2021-05-04 PROCEDURE — 99214 OFFICE O/P EST MOD 30 MIN: CPT | Performed by: NURSE PRACTITIONER

## 2021-05-04 NOTE — PATIENT INSTRUCTIONS
217 [facility]., [place]. 1668 [facility], [address] Tel.  [phone] Fax. 100 [facility] 60 [place], 200 [address] [facility] Tel.  [phone] Fax. [phone] [address] Tel.  [phone] Fax. [phone] Learning About CPAP for Sleep Apnea What is CPAP? CPAP is a small machine that you use at home every night while you sleep. It increases air pressure in your throat to keep your airway open. When you have sleep apnea, this can help you sleep better so you feel much better. CPAP stands for \"continuous positive airway pressure. \" The CPAP machine will have one of the following: · A mask that covers your nose and mouth · Prongs that fit into your nose · A mask that covers your nose only, the most common type. This type is called NCPAP. The N stands for \"nasal.\" Why is it done? CPAP is usually the best treatment for obstructive sleep apnea. It is the first treatment choice and the most widely used. Your doctor may suggest CPAP if you have: · Moderate to severe sleep apnea. · Sleep apnea and coronary artery disease (CAD) or heart failure. How does it help? · CPAP can help you have more normal sleep, so you feel less sleepy and more alert during the daytime. · CPAP may help keep heart failure or other heart problems from getting worse. · NCPAP may help lower your blood pressure. · If you use CPAP, your bed partner may also sleep better because you are not snoring or restless. What are the side effects? Some people who use CPAP have: · A dry or stuffy nose and a sore throat. · Irritated skin on the face. · Sore eyes. · Bloating. If you have any of these problems, work with your doctor to fix them. Here are some things you can try: · Be sure the mask or nasal prongs fit well. · See if your doctor can adjust the pressure of your CPAP. · If your nose is dry, try a humidifier.  
· If your nose is runny or stuffy, try decongestant medicine or a steroid nasal spray. If these things do not help, you might try a different type of machine. Some machines have air pressure that adjusts on its own. Others have air pressures that are different when you breathe in than when you breathe out. This may reduce discomfort caused by too much pressure in your nose. Where can you learn more? Go to [employer].be Enter W895 in the search box to learn more about \"Learning About CPAP for Sleep Apnea. \"  
© 1559-9418 Healthwise, Incorporated. Care instructions adapted under license by [facility] [employer] (which disclaims liability or warranty for this information). This care instruction is for use with your licensed healthcare professional. If you have questions about a medical condition or this instruction, always ask your healthcare professional. [address] any warranty or liability for your use of this information. Content Version: 6.9.29071; Last Revised: January 11, 2010 PROPER SLEEP HYGIENE What to avoid · Do not have drinks with caffeine, such as coffee or black tea, for 8 hours before bed. · Do not smoke or use other types of tobacco near bedtime. Nicotine is a stimulant and can keep you awake. · Avoid drinking alcohol late in the evening, because it can cause you to wake in the middle of the night. · Do not eat a big meal close to bedtime. If you are hungry, eat a light snack. · Do not drink a lot of water close to bedtime, because the need to urinate may wake you up during the night. · Do not read or watch TV in bed. Use the bed only for sleeping and sexual activity. What to try · Go to bed at the same time every night, and wake up at the same time every morning. Do not take naps during the day. · Keep your bedroom quiet, dark, and cool. · Get regular exercise, but not within 3 to 4 hours of your bedtime. Vira Libman · Sleep on a comfortable pillow and mattress.  
· If watching the clock makes you anxious, turn it facing away from you so you cannot see the time. · If you worry when you lie down, start a worry book. Well before bedtime, write down your worries, and then set the book and your concerns aside. · Try meditation or other relaxation techniques before you go to bed. · If you cannot fall asleep, get up and go to another room until you feel sleepy. Do something relaxing. Repeat your bedtime routine before you go to bed again. · Make your house quiet and calm about an hour before bedtime. Turn down the lights, turn off the TV, log off the computer, and turn down the volume on music. This can help you relax after a busy day. Drowsy Driving: The [employer] cites drowsiness as a causing factor in more than 918,788 police reported crashes annually, resulting in 76,000 injuries and 1,500 deaths. Other surveys suggest 55% of people polled have driven while drowsy in the past year, 23% had fallen asleep but not crashed, 3% crashed, and 2% had and accident due to drowsy driving. Who is at risk? Young Drivers: One study of drowsy driving accidents states that 55% of the drivers were under 25 years. Of those, 75% were male. Shift Workers and Travelers: People who work overnight or travel across time zones frequently are at higher risk of experiencing Circadian Rhythm Disorders. They are trying to work and function when their body is programed to sleep. Sleep Deprived: Lack of sleep has a serious impact on your ability to pay attention or focus on a task. Consistently getting less than the average of 8 hours your body needs creates partial or cumulative sleep deprivation. Untreated Sleep Disorders: Sleep Apnea, Narcolepsy, R.L.S., and other sleep disorders (untreated) prevent a person from getting enough restful sleep. This leads to excessive daytime sleepiness and increases the risk for drowsy driving accidents by up to 7 times.  
Medications / Alcohol: Even over the counter medications can cause drowsiness. Medications that impair a drivers attention should have a warning label. Alcohol naturally makes you sleepy and on its own can cause accidents. Combined with excessive drowsiness its effects are amplified. Signs of Drowsy Driving: * You don't remember driving the last few miles * You may drift out of your jo ann * You are unable to focus and your thoughts wander * You may yawn more often than normal 
 * You have difficulty keeping your eyes open / nodding off * Missing traffic signs, speeding, or tailgating Prevention-  
Good sleep hygiene, lifestyle and behavioral choices have the most impact on drowsy driving. There is no substitute for sleep and the average person requires 8 hours nightly. If you find yourself driving drowsy, stop and sleep. Consider the sleep hygiene tips provided during your visit as well. Medication Refill Policy: Refills for all medications require 1 week advance notice. Please have your pharmacy fax a refill request. We are unable to fax, or call in \"controled substance\" medications and you will need to pick these prescriptions up from our office. [employer] Activation Thank you for requesting access to [employer]. Please follow the instructions below to securely access and download your online medical record. [employer] allows you to send messages to your doctor, view your test results, renew your prescriptions, schedule appointments, and more. How Do I Sign Up? 1. In your internet browser, go to [email or web address]. 2. Click on the First Time User? Click Here link in the Sign In box. You will see the New Member Sign Up page. 3. Enter your [employer] Access Code exactly as it appears below. You will not need to use this code after youve completed the sign-up process. If you do not sign up before the expiration date, you must request a new code. [employer] Access Code:  Activation code not generated Current Storitz Status: Active (This is the date your Storitz access code will ) 4. Enter the last four digits of your Social Security Number (xxxx) and Date of Birth (mm/dd/yyyy) as indicated and click Submit. You will be taken to the next sign-up page. 5. Create a AutoMedxt ID. This will be your Storitz login ID and cannot be changed, so think of one that is secure and easy to remember. 6. Create a Storitz password. You can change your password at any time. 7. Enter your Password Reset Question and Answer. This can be used at a later time if you forget your password. 8. Enter your e-mail address. You will receive e-mail notification when new information is available in [address] 9. Click Sign Up. You can now view and download portions of your medical record. 10. Click the Download Summary menu link to download a portable copy of your medical information. Additional Information If you have questions, please call [phone]. Remember, Storitz is NOT to be used for urgent needs. For medical emergencies, dial 911.

## 2021-05-04 NOTE — PROGRESS NOTES
[address] [place]. [place], [address]   Tel.  [phone]   Fax. 100 [facility] 60   [place], 200 [address] [facility]   Tel.  [phone]   Fax. [phone] 9250 [facility] [address] Doming[address]    Tel.  [phone]   Fax. [phone]     Gerardo Eaton (: 1955) is a 77 y.o. female, established patient, seen for positive airway pressure follow-up, she was last seen by Dr. Oliver Mcknight on 3/24/2020, prior notes reviewed in detail. Prior sleep apnea diagnosis in 2016 years ago, treated with APAP. Home sleep test 3/2020 showed AHI of 17.8/hr with a lowest SpO2 of 86%, duration of SpO2 < 88% 0.2 min. Subsequent titration 2021 showed central events at optimal pressure, ASV titration completed 2021 . ASSESSMENT/PLAN:    ICD-10-CM ICD-9-CM    1. Complex sleep apnea syndrome  G47.31 327.21 AMB SUPPLY ORDER      AMB SUPPLY ORDER   2. GILDA (obstructive sleep apnea)  G47.33 327.23    3. Atrial fibrillation, unspecified type ([facility]  I48.91 427.31    4. Thyroid disease  E07.9 246.9    5. Adult BMI 36.0-36.9 kg/sq m  Z68.36 V85.36          AHI = 17.8(3/2020). On ASV, Respironics :  Max IPAP 30, EPAP 6-12, PS 0-24 cmH2O. Set up 3/17/2021. She is not compliant with PAP therapy although when used PAP shows benefit to the patient and remains necessary for control of her sleep apnea. There is continued clinical benefit from the hours of use demonstrated by AHI reduced from 17.8/hr to 1.9/hr. Follow-up and Dispositions    · Return in about 6 weeks (around 6/15/2021). 1. Sleep Apnea - Change pressure setting to  Max IPAP 16, EPAP 6-12, PS 0-10. Changes made by provider in  Care  system and [place] [facility] to contact patient  to check on pressures. * Counseling was provided regarding the importance of regular PAP use with emphasis on ensuring sufficient total sleep time, proper sleep hygiene, and safe driving.     Orders Placed This Encounter    AMB SUPPLY ORDER     Diagnosis: (G47.33) GILDA (obstructive sleep apnea)  (primary encounter diagnosis)     Replacement Supplies for Positive Airway Pressure Therapy Device:   Duration of need: 99 months.  Nasal Cushion (Replace) 2 per month.  Nasal Interface Mask 1 every 3 months.  Pos Airway pressure chin strap   Headgear 1 every 6 months.  Tubing with heating element 1 every 3 months.  Filter(s) Disposable 2 per month.  Filter(s) Non-Disposable 1 every 6 months. .   [address] for Humidifier (Replace) 1 every 6 months. FIONA Mora; NPI: [id number]    Electronically signed. Date:- 05/04/21    AMB SUPPLY ORDER     Diagnosis: (G47.31 Complex sleep apnea)  (primary encounter diagnosis)     Pressure change ASV, Bi - Level to Max IPAP 15, EPAP 6-12, PS 0-10 cmH2O. Changes made in sleep lab. FIONA Mora; NPI: [id number]    Electronically signed. Date:- 05/04/21     3. Atrial Fibrilation - continue on her current regimen. I have reviewed the relationship between heart arrhythmias and sleep disordered breathing. 4. Thyroid Disorder - continue on her current regimen. 5. Recommended a dedicated weight loss program through appropriate diet and exercise regimen as significant weight reduction has been shown to reduce severity of obstructive sleep apnea. SUBJECTIVE/OBJECTIVE:    She  is seen today for follow up on PAP device and reports some problems using the device. The following concerns identified:    Drowsiness no Problems exhaling yes   Snoring no Forget to put on no   Mask Comfortable yes Can't fall asleep no   Dry Mouth no Mask falls off no   Air Leaking Yes, oral venting Frequent awakenings yes       She admits that her sleep has not changed on PAP therapy using nasal pillows mask and heated tubing.  She had previously been using the nasal around the nose mask and preferred this over the pillows. She tired the ASV and was having issues with air escaping from mouth and feeling that the device's breathing rhythm was out of synch with her breathing. Review of ASV device download indicates 5 days used and then patient reverted to old APAP device due to comfort issues. She was predominantly awake when using the ASV device those days:  ASV BiLevel pressure: Max IPAP 30, EPAP 6-12, PS 0-24  cmH2O;   Avg. Device Pressure <= 90 %: EPAP 6.8, PS 3.4 (IPAP 10.2) cmH2O   Average % Night in Large Leak:  0.0  % Used Days >= 4 hours: 13.  Avg hours used:  4:12. Therapy Apnea Index averaged over PAP use: 1.9 /hr which reflects improved sleep breathing condition. Review of prior Resmed APAP device (setup 10/2016)  shows daily use for prior 30 days -   Auto Device Set 4-8 cm H2O  Max Pressure: 8.0  95th percent pressure: 7.6    CMS Compliance % Used Days >= 4 hours: 60.    Days used overall: 100%   Avg Hours used : 4:19  95th Percentile Leak: 21 L/Min       Therapy Apnea Index averaged over PAP use: 2.0 /hr which reflects improved sleep breathing condition. [place] Sleepiness Score: 7 and Modified F.O.S.Q. Score Total / 2: 18.5 which reflects improved sleep quality over therapy time. Sleep Review of Systems: notable for Positive difficulty falling asleep; Positive awakenings at night; Negative early morning headaches; Negative memory problems; Negative concentration issues;  Negative chest pain; Negative shortness of breath; Negative significant joint pain at night; Negative significant muscle pain at night; Negative rashes or itching; Positive heartburn; Negative significant mood issues    Vitals reported by patient     Patient-Reported Vitals 5/4/2021   Patient-Reported Weight 214 lbs   Patient-Reported Temperature -   Patient-Reported Systolic  -   Patient-Reported Diastolic -      Calculated BMI 36    Physical Exam completed by visual and auditory observation of patient with verbal input from patient. General:   Alert, oriented, not in acute distress   Eyes:  Anicteric Sclerae; no obvious strabismus   Nose:  No obvious nasal septum deviation    Neck:   Midline trachea, no visible mass   Chest/Lungs:  Respiratory effort normal, no visualized signs of difficulty breathing or respiratory distress   CVS:  No JVD   Extremities:  No obvious rashes noted on face, neck, or hands   Neuro:  No facial asymmetry, no focal deficits; no obvious tremor    Psych:  Normal affect,  normal countenance     General Sinner is being evaluated by a Virtual Visit (video visit) encounter to address concerns as mentioned above. A caregiver was present when appropriate. Due to this being a TeleHealth encounter ([facility]), evaluation of the following organ systems was limited: Vitals/Constitutional/EENT/Resp/CV/GI//MS/Neuro/Skin/Heme-Lymph-Imm. Pursuant to the emergency declaration under the [address] [place] [address] and the [employer] and [employer] Act, this Virtual Visit was conducted with patient's (and/or legal guardian's) consent, to reduce the patient's risk of exposure to COVID-19 and provide necessary medical care. Patient identification was verified at the start of the visit: YES using name and date of birth. Patient's phone number [phone] (home)  was confirmed for accuracy. She gives permission for messages regarding results and appointments to be left at that number. Services were provided through a video synchronous discussion virtually to substitute for in-person clinic visit. I was in the office while conducting this encounter, patient located at their home or alternate location of their choice.     On this date 05/04/2021 I have spent 39 minutes reviewing previous notes, test results and face to face with the patient discussing the diagnosis and importance of compliance with the treatment plan as well as documenting on the day of the visit. An electronic signature was used to authenticate this note.     -- Radha Welch NP, [facility]  05/04/21

## 2021-05-06 ENCOUNTER — TELEPHONE (OUTPATIENT)
Dept: SLEEP MEDICINE | Age: 66
End: 2021-05-06

## 2021-05-06 NOTE — TELEPHONE ENCOUNTER
Charli Carbone (: 1955) last seen by me on 21, previously seen by Dr. Radha Bautista on 3/24/2020, prior notes reviewed in detail. Prior sleep apnea diagnosis in 2016 years ago, treated with APAP. Home sleep test 3/2020 showed AHI of 17.8/hr with a lowest SpO2 of 86%, duration of SpO2 < 88% 0.2 min. Subsequent titration 2021 showed central events at optimal pressure, ASV titration completed 2021 . Pressure adjustments made at last visit, contacted patient today and she is using new machine and pressures feel better. She will contact DME regarding changing mask style.     Prasad Hummel NP, [facility]  21

## 2021-06-15 ENCOUNTER — VIRTUAL VISIT (OUTPATIENT)
Dept: SLEEP MEDICINE | Age: 66
End: 2021-06-15
Payer: COMMERCIAL

## 2021-06-15 VITALS — BODY MASS INDEX: 36.7 KG/M2 | WEIGHT: 215 LBS | HEIGHT: 64 IN

## 2021-06-15 DIAGNOSIS — I48.91 ATRIAL FIBRILLATION, UNSPECIFIED TYPE (HCC): ICD-10-CM

## 2021-06-15 DIAGNOSIS — G47.31 COMPLEX SLEEP APNEA SYNDROME: Primary | ICD-10-CM

## 2021-06-15 DIAGNOSIS — E07.9 THYROID DISEASE: ICD-10-CM

## 2021-06-15 PROCEDURE — 99442 PR PHYS/QHP TELEPHONE EVALUATION 11-20 MIN: CPT | Performed by: NURSE PRACTITIONER

## 2021-06-15 NOTE — PROGRESS NOTES
217 [facility]. [place], [address]   Tel.  [phone]   Fax. 100 [facility] 60   [place], 200 [address] [facility]   Tel.  [phone]   Fax. [phone] [address]   Tel.  [phone]   Fax. [phone]     Taylor Melendrez (: 1955) is a 77 y.o. female, established patient, seen for positive airway pressure follow-up, she was last seen by me on 2021, previously seen by Dr. Balaji Akhtar on 3/24/2020, prior notes reviewed in detail. Prior sleep apnea diagnosis in 2016 years ago, treated with APAP. Home sleep test 3/2020 showed AHI of 17.8/hr with a lowest SpO2 of 86%, duration of SpO2 < 88% 0.2 min. Subsequent titration 2021 showed central events at optimal pressure, ASV titration completed 2021. ASSESSMENT/PLAN:    ICD-10-CM ICD-9-CM    1. Complex sleep apnea syndrome  G47.31 327.21    2. Atrial fibrillation, unspecified type ([facility]  I48.91 427.31    3. Thyroid disease  E07.9 246.9    4. Adult BMI 37.0-37.9 kg/sq m  Z68.37 V85.37          AHI = 17.8(3/2020). On ASV, Respironics :  Max IPAP 30, EPAP 6-12, PS 0-24 cmH2O. Set up 3/17/2021.       She is compliant with PAP therapy and PAP shows benefit to the patient and remains necessary for control of her sleep apnea. She is on Respironics device, we discussed the recall, she will return to using her old Resmed device and be seen for follow up on those pressure settings. Follow-up and Dispositions    · Return in about 4 weeks (around 2021) for follow up on return to [facility] device. 1. Sleep Apnea -  Resume Using Resmed device. Positional therapy discussed. * She was asked to contact our office for any problems regarding PAP therapy. 2.  Atrial Fibrilation - continue on her current regimen. I have reviewed the relationship between heart arrhythmias and sleep disordered breathing.   She will follow up with her cardiologist about medication questions she has.    3. Thyroid Disorder - continue on her current regimen. 4.  Recommended a dedicated weight loss program through appropriate diet and exercise regimen as significant weight reduction has been shown to reduce severity of obstructive sleep apnea. SUBJECTIVE/OBJECTIVE:    She  is seen today for follow up on PAP device and reports some problems using the device. The following concerns identified:    Drowsiness no Problems exhaling no   Snoring no Forget to put on no   Mask Comfortable yes Can't fall asleep no   Dry Mouth no Mask falls off no   Air Leaking no Frequent awakenings no       She admits that her sleep has improved on PAP therapy using nasal mask and heated tubing. She feels the pressure is still too high on the Bilevel device. Recall discussed and she will contact DME to register, she will return to using [email or web address] Airsense 10 APAP device set at 5-9cmH2O. .    Review of device download indicated:  ASV BiLevel pressure: Max IPAP 16, EPAP 6-12, PS 0-10  cmH2O;   Avg. Device Pressure <= 90 %: EPAP 8, PS 4.5 (IPAP 10.2) cmH2O     Average % Night in Large Leak:  0.9  % Used Days >= 4 hours: 70. Avg hours used:  4:50. Therapy Apnea Index averaged over PAP use: 2.4 /hr which reflects improved sleep breathing condition. [place] Sleepiness Score: 4 and Modified F.O.S.Q. Score Total / 2: 17 which reflects improved sleep quality over therapy time. Sleep Review of Systems: notable for Negative difficulty falling asleep; Positive awakenings at night; Negative early morning headaches; Negative memory problems; Negative concentration issues; Negative chest pain; Negative shortness of breath; Negative significant joint pain at night; Negative significant muscle pain at night; Negative rashes or itching; Negative heartburn; Negative significant mood issues;      Vitals reported by patient     Patient-Reported Vitals 5/4/2021   Patient-Reported Weight 214 lbs   Patient-Reported Temperature - Patient-Reported Systolic  -   Patient-Reported Diastolic -      Calculated BMI 37    Physical Exam not completed due to audio only visit. Pursuant to the emergency declaration under the [facility] [address] and the [employer] and [employer] Act, this telephone encounter was conducted with patient's (and/or legal guardian's) consent, to reduce the risk of exposure to COVID-19 and provide necessary medical care. Services were provided through a telephone call to substitute for in-person encounter. I was at home while conducting this encounter, patient located at their home or alternate location of their choice. Patient identification was verified at the start of the visit: YES using name and date of birth. Patient's phone number [phone] (home)  was confirmed for accuracy. She gives permission for messages regarding results and appointments to be left at that number. On this date 06/15/21 I have spent 20 minutes reviewing previous notes, test results, and on an audio only visit with the patient discussing the diagnosis and importance of compliance with the treatment plan as well as documenting on the day of the visit. An electronic signature was used to authenticate this note.     -- Zora West NP, [facility]  06/15/21

## 2021-06-15 NOTE — PATIENT INSTRUCTIONS
217 [facility] [place]. 1668 [facility], [address] Tel.  [phone] Fax. 100 [facility] [address] [place], [address] [facility] Tel.  [phone] Fax. [phone] [address] [place] [address] [place] Tel.  [phone] Fax. [phone] Learning About CPAP for Sleep Apnea What is CPAP? CPAP is a small machine that you use at home every night while you sleep. It increases air pressure in your throat to keep your airway open. When you have sleep apnea, this can help you sleep better so you feel much better. CPAP stands for \"continuous positive airway pressure. \" The CPAP machine will have one of the following: · A mask that covers your nose and mouth · Prongs that fit into your nose · A mask that covers your nose only, the most common type. This type is called NCPAP. The N stands for \"nasal.\" Why is it done? CPAP is usually the best treatment for obstructive sleep apnea. It is the first treatment choice and the most widely used. Your doctor may suggest CPAP if you have: · Moderate to severe sleep apnea. · Sleep apnea and coronary artery disease (CAD) or heart failure. How does it help? · CPAP can help you have more normal sleep, so you feel less sleepy and more alert during the daytime. · CPAP may help keep heart failure or other heart problems from getting worse. · NCPAP may help lower your blood pressure. · If you use CPAP, your bed partner may also sleep better because you are not snoring or restless. What are the side effects? Some people who use CPAP have: · A dry or stuffy nose and a sore throat. · Irritated skin on the face. · Sore eyes. · Bloating. If you have any of these problems, work with your doctor to fix them. Here are some things you can try: · Be sure the mask or nasal prongs fit well. · See if your doctor can adjust the pressure of your CPAP. · If your nose is dry, try a humidifier.  
· If your nose is runny or stuffy, try decongestant medicine or a steroid nasal spray. If these things do not help, you might try a different type of machine. Some machines have air pressure that adjusts on its own. Others have air pressures that are different when you breathe in than when you breathe out. This may reduce discomfort caused by too much pressure in your nose. Where can you learn more? Go to Linio.be Enter N680 in the search box to learn more about \"Learning About CPAP for Sleep Apnea. \"  
© 7718-8053 Healthwise, Incorporated. Care instructions adapted under license by [facility] [employer] (which disclaims liability or warranty for this information). This care instruction is for use with your licensed healthcare professional. If you have questions about a medical condition or this instruction, always ask your healthcare professional. [address] any warranty or liability for your use of this information. Content Version: 4.8.33082; Last Revised: January 11, 2010 PROPER SLEEP HYGIENE What to avoid · Do not have drinks with caffeine, such as coffee or black tea, for 8 hours before bed. · Do not smoke or use other types of tobacco near bedtime. Nicotine is a stimulant and can keep you awake. · Avoid drinking alcohol late in the evening, because it can cause you to wake in the middle of the night. · Do not eat a big meal close to bedtime. If you are hungry, eat a light snack. · Do not drink a lot of water close to bedtime, because the need to urinate may wake you up during the night. · Do not read or watch TV in bed. Use the bed only for sleeping and sexual activity. What to try · Go to bed at the same time every night, and wake up at the same time every morning. Do not take naps during the day. · Keep your bedroom quiet, dark, and cool. · Get regular exercise, but not within 3 to 4 hours of your bedtime. Lisesth Edwards · Sleep on a comfortable pillow and mattress.  
· If watching the clock makes you anxious, turn it facing away from you so you cannot see the time. · If you worry when you lie down, start a worry book. Well before bedtime, write down your worries, and then set the book and your concerns aside. · Try meditation or other relaxation techniques before you go to bed. · If you cannot fall asleep, get up and go to another room until you feel sleepy. Do something relaxing. Repeat your bedtime routine before you go to bed again. · Make your house quiet and calm about an hour before bedtime. Turn down the lights, turn off the TV, log off the computer, and turn down the volume on music. This can help you relax after a busy day. Drowsy Driving: The [facility] cites drowsiness as a causing factor in more than 396,462 police reported crashes annually, resulting in 76,000 injuries and 1,500 deaths. Other surveys suggest 55% of people polled have driven while drowsy in the past year, 23% had fallen asleep but not crashed, 3% crashed, and 2% had and accident due to drowsy driving. Who is at risk? Young Drivers: One study of drowsy driving accidents states that 55% of the drivers were under 25 years. Of those, 75% were male. Shift Workers and Travelers: People who work overnight or travel across time zones frequently are at higher risk of experiencing Circadian Rhythm Disorders. They are trying to work and function when their body is programed to sleep. Sleep Deprived: Lack of sleep has a serious impact on your ability to pay attention or focus on a task. Consistently getting less than the average of 8 hours your body needs creates partial or cumulative sleep deprivation. Untreated Sleep Disorders: Sleep Apnea, Narcolepsy, R.L.S., and other sleep disorders (untreated) prevent a person from getting enough restful sleep. This leads to excessive daytime sleepiness and increases the risk for drowsy driving accidents by up to 7 times.  
Medications / Alcohol: Even over the counter medications can cause drowsiness. Medications that impair a drivers attention should have a warning label. Alcohol naturally makes you sleepy and on its own can cause accidents. Combined with excessive drowsiness its effects are amplified. Signs of Drowsy Driving: * You don't remember driving the last few miles * You may drift out of your jo ann * You are unable to focus and your thoughts wander * You may yawn more often than normal 
 * You have difficulty keeping your eyes open / nodding off * Missing traffic signs, speeding, or tailgating Prevention-  
Good sleep hygiene, lifestyle and behavioral choices have the most impact on drowsy driving. There is no substitute for sleep and the average person requires 8 hours nightly. If you find yourself driving drowsy, stop and sleep. Consider the sleep hygiene tips provided during your visit as well. Medication Refill Policy: Refills for all medications require 1 week advance notice. Please have your pharmacy fax a refill request. We are unable to fax, or call in \"controled substance\" medications and you will need to pick these prescriptions up from our office. [employer] Activation Thank you for requesting access to [employer]. Please follow the instructions below to securely access and download your online medical record. [employer] allows you to send messages to your doctor, view your test results, renew your prescriptions, schedule appointments, and more. How Do I Sign Up? 1. In your internet browser, go to [email or web address]. 2. Click on the First Time User? Click Here link in the Sign In box. You will see the New Member Sign Up page. 3. Enter your [employer] Access Code exactly as it appears below. You will not need to use this code after youve completed the sign-up process. If you do not sign up before the expiration date, you must request a new code. [employer] Access Code:  Activation code not generated Current [employer] Status: Active (This is the date your [employer] access code will ) 4. Enter the last four digits of your Social Security Number (xxxx) and Date of Birth (mm/dd/yyyy) as indicated and click Submit. You will be taken to the next sign-up page. 5. Create a Cell-[employer]-Spott ID. This will be your Cell-[employer]-Spott login ID and cannot be changed, so think of one that is secure and easy to remember. 6. Create a [employer] password. You can change your password at any time. 7. Enter your Password Reset Question and Answer. This can be used at a later time if you forget your password. 8. Enter your e-mail address. You will receive e-mail notification when new information is available in [address] 9. Click Sign Up. You can now view and download portions of your medical record. 10. Click the Download Summary menu link to download a portable copy of your medical information. Additional Information If you have questions, please call [phone]. Remember, [employer] is NOT to be used for urgent needs. For medical emergencies, dial 911.

## 2021-09-17 ENCOUNTER — TELEPHONE (OUTPATIENT)
Dept: SLEEP MEDICINE | Age: 66
End: 2021-09-17

## 2021-09-17 ENCOUNTER — DOCUMENTATION ONLY (OUTPATIENT)
Dept: SLEEP MEDICINE | Age: 66
End: 2021-09-17

## 2021-09-17 DIAGNOSIS — G47.31 COMPLEX SLEEP APNEA SYNDROME: Primary | ICD-10-CM

## 2021-09-17 NOTE — TELEPHONE ENCOUNTER
Stephany Batres (: 1955) last seen by me on 6/15/2021, previously seen by Dr. Alley Pereira 3/24/2020, prior notes reviewed in [address] [place] [address] sleep apnea diagnosis in 2016 years ago, treated with APAP.  Home sleep test 3/2020 showed AHI of 17.8/hr with a lowest SpO2 of 86%, duration of SpO2 < 88% 0.2 min. Subsequent titration 2021 showed central events at optimal pressure, ASV titration completed 2021. Alix ASV set up 3/17/2021 and returned to [facility] due to recall. Patient was to resume ResMed use and follow up 21 with device for download and pressure adjustment if needed. Patient called requesting order for new Resmed ASV. Orders Placed This Encounter    AMB SUPPLY ORDER     Diagnosis: Complex Sleep Apnea G47.31    Positive Airway Pressure Therapy: Duration of need: 99 months. BiLevel Auto ASV [id number] with Heated Humidifier [id number]: Max IPAP 16, EPAP 6-12, PS 0-10  Min EPAP:  6 cmH2O / Max EPAP: 10 cmH2O;  Min PS:       0 cmH2O / Max PS:     8 cmH2O;  Ramp Time:  20 cmH2O, Back up Rate: Auto;       Nasal Cushion (Replace) 2 per month.  Nasal Interface Mask 1 every 3 months.  Pos Airway pressure chin strap   Headgear 1 every 6 months.  Tubing with heating element 1 every 3 months.  Filter(s) Disposable 2 per month.  Filter(s) Non-Disposable 1 every 6 months. .   [facility] for Humidifier (Replace) 1 every 6 months. FIONA Saeed; NPI: [id number]    Electronically signed.  Date:- 21     Ramón Queen NP, [facility]  21

## 2021-09-17 NOTE — TELEPHONE ENCOUNTER
Patient phoned the office requesting an order for a new BI-PAP. She stated that she received a new device and discovered it was recalled. She has since returned the device and is using her old machine from 2016. At this point she is experiencing chronic fatigue and would like a new order for another BI-PAP.

## 2023-03-27 ENCOUNTER — TELEPHONE (OUTPATIENT)
Dept: SLEEP MEDICINE | Age: 68
End: 2023-03-27

## 2023-03-27 NOTE — TELEPHONE ENCOUNTER
Patient called stated the pressure settings are too high and patient mouth is dried out.   Patient would like a callback

## 2023-05-11 RX ORDER — LEVOTHYROXINE SODIUM 0.03 MG/1
TABLET ORAL
COMMUNITY

## 2023-05-11 RX ORDER — PANTOPRAZOLE SODIUM 40 MG/1
TABLET, DELAYED RELEASE ORAL
COMMUNITY
Start: 2020-09-11

## 2023-05-11 RX ORDER — SCOLOPAMINE TRANSDERMAL SYSTEM 1 MG/1
PATCH, EXTENDED RELEASE TRANSDERMAL
COMMUNITY
Start: 2020-09-11

## 2023-05-11 RX ORDER — METHYLPREDNISOLONE 4 MG/1
TABLET ORAL
COMMUNITY
Start: 2020-06-01

## 2023-05-11 RX ORDER — FLUTICASONE PROPIONATE 50 MCG
2 SPRAY, SUSPENSION (ML) NASAL DAILY
COMMUNITY

## 2023-05-11 RX ORDER — METOPROLOL SUCCINATE 25 MG/1
TABLET, EXTENDED RELEASE ORAL 2 TIMES DAILY
COMMUNITY

## 2023-05-11 RX ORDER — GABAPENTIN 300 MG/1
CAPSULE ORAL 3 TIMES DAILY
COMMUNITY
Start: 2020-06-01

## 2023-05-11 RX ORDER — ASPIRIN 81 MG/1
TABLET ORAL DAILY
COMMUNITY

## 2023-05-11 RX ORDER — MECLIZINE HCL 12.5 MG/1
TABLET ORAL
COMMUNITY
Start: 2020-09-11

## 2023-05-11 RX ORDER — DIAZEPAM 2 MG/1
TABLET ORAL
COMMUNITY
Start: 2020-09-11

## 2023-10-24 ENCOUNTER — OFFICE VISIT (OUTPATIENT)
Age: 68
End: 2023-10-24
Payer: COMMERCIAL

## 2023-10-24 VITALS
HEART RATE: 81 BPM | SYSTOLIC BLOOD PRESSURE: 118 MMHG | WEIGHT: 201 LBS | HEIGHT: 64 IN | OXYGEN SATURATION: 98 % | DIASTOLIC BLOOD PRESSURE: 75 MMHG | BODY MASS INDEX: 34.31 KG/M2

## 2023-10-24 DIAGNOSIS — I48.91 ATRIAL FIBRILLATION, UNSPECIFIED TYPE (HCC): ICD-10-CM

## 2023-10-24 DIAGNOSIS — G47.33 OSA (OBSTRUCTIVE SLEEP APNEA): Primary | ICD-10-CM

## 2023-10-24 PROCEDURE — 99214 OFFICE O/P EST MOD 30 MIN: CPT | Performed by: NURSE PRACTITIONER

## 2023-10-24 PROCEDURE — 1123F ACP DISCUSS/DSCN MKR DOCD: CPT | Performed by: NURSE PRACTITIONER

## 2023-10-24 RX ORDER — LEVOTHYROXINE SODIUM 0.05 MG/1
TABLET ORAL
COMMUNITY
Start: 2023-05-08

## 2023-10-24 ASSESSMENT — SLEEP AND FATIGUE QUESTIONNAIRES
HOW LIKELY ARE YOU TO NOD OFF OR FALL ASLEEP WHILE WATCHING TV: 1
HOW LIKELY ARE YOU TO NOD OFF OR FALL ASLEEP WHILE SITTING QUIETLY AFTER LUNCH WITHOUT ALCOHOL: 1
HOW LIKELY ARE YOU TO NOD OFF OR FALL ASLEEP IN A CAR, WHILE STOPPED FOR A FEW MINUTES IN TRAFFIC: 0
HOW LIKELY ARE YOU TO NOD OFF OR FALL ASLEEP WHILE SITTING AND READING: 2
HOW LIKELY ARE YOU TO NOD OFF OR FALL ASLEEP WHILE SITTING INACTIVE IN A PUBLIC PLACE: 0
HOW LIKELY ARE YOU TO NOD OFF OR FALL ASLEEP WHEN YOU ARE A PASSENGER IN A CAR FOR AN HOUR WITHOUT A BREAK: 0
HOW LIKELY ARE YOU TO NOD OFF OR FALL ASLEEP WHILE SITTING AND TALKING TO SOMEONE: 0
ESS TOTAL SCORE: 6
HOW LIKELY ARE YOU TO NOD OFF OR FALL ASLEEP WHILE LYING DOWN TO REST IN THE AFTERNOON WHEN CIRCUMSTANCES PERMIT: 2

## 2023-10-24 NOTE — PROGRESS NOTES
17[address] [facility]., [place]. [place], [address]   Tel.  [phone]   Fax. [facility]   [place], [address]   Tel.  [phone]   Fax. [phone]  [facility], [address] [facility]   Tel.  [phone]   Fax. [phone]     Yue Arrington (: 1955) is a 77 y.o. female, established patient, seen for positive airway pressure follow-up and evaluation. She was last seen by me on 6/15/2021, previously seen by Dr. Chinyere Pichardo on 3/24/2020, prior notes and sleep testing reviewed in detail. Prior sleep apnea diagnosis in 2016 years ago, treated with APAP. Home sleep test 3/2020 showed AHI of 17.8/hr with a lowest SpO2 of 86%, duration of SpO2 < 88% 0.2 min. Subsequent titration 2021 showed central events at optimal  pressure, ASV titration completed 2021. Weight at time of sleep testing 200 pounds. ASSESSMENT/PLAN:   Diagnosis Orders   1. ELLI (obstructive sleep apnea)  DME Order for [facility]) as OP      2. Atrial fibrillation, unspecified type ([address])        3. Adult BMI 35.0-35.9 kg/sq m            AHI = 17.8(3/2020). On Resmed S10 APAP 5-9 cm H2O. Set up 10/2016.  3 G Modem no longer sending data. Previously on ASV, Respironics :  Max IPAP 16, EPAP 6-12, PS 0-10 cmH2O. Set up 3/17/2021. Replacement serial # [id number] set up 2022. She is adherent with PAP therapy and PAP continues to benefit patient and remains necessary for control of her sleep apnea. She reports she is using her APAP device, she will bring it in for download. Follow-up and Dispositions    Return in about 3 months (around 2024) for compliance follow up. Sleep Apnea -  Sleep apnea condition has been exacerbated and treatment is causing negative side effects. Change in treatment plan is needed. Change current pressure settings to  ASV Max IPAP 12, Min EPAP 6, PS 0-6 cmH2O. Changes made by provider in  Care  system and sent to [facility].     * Tech

## 2023-10-24 NOTE — PATIENT INSTRUCTIONS
nasal spray. If these things do not help, you might try a different type of machine. Some machines have air pressure that adjusts on its own. Others have air pressures that are different when you breathe in than when you breathe out. This may reduce discomfort caused by too much pressure in your nose. Where can you learn more? Go to RJMetrics.be  Enter Timmy Mukuldavid in the search box to learn more about \"Learning About CPAP for Sleep Apnea. \"   Kathrin Helton 9417-6299 [employer], [employer]. Care instructions adapted under license by [employer] (which disclaims liability or warranty for this information). This care instruction is for use with your licensed healthcare professional. If you have questions about a medical condition or this instruction, always ask your healthcare professional. [address] any warranty or liability for your use of this information. Content Version: 1.1.01489; Last Revised: January 11, 2010  PROPER SLEEP HYGIENE    What to avoid  Do not have drinks with caffeine, such as coffee or black tea, for 8 hours before bed. Do not smoke or use other types of tobacco near bedtime. Nicotine is a stimulant and can keep you awake. Avoid drinking alcohol late in the evening, because it can cause you to wake in the middle of the night. Do not eat a big meal close to bedtime. If you are hungry, eat a light snack. Do not drink a lot of water close to bedtime, because the need to urinate may wake you up during the night. Do not read or watch TV in bed. Use the bed only for sleeping and sexual activity. What to try  Go to bed at the same time every night, and wake up at the same time every morning. Do not take naps during the day. Keep your bedroom quiet, dark, and cool. Get regular exercise, but not within 3 to 4 hours of your bedtime. .  Sleep on a comfortable pillow and mattress.   If watching the clock makes you anxious, turn it facing away from you so

## 2024-01-10 ENCOUNTER — TELEPHONE (OUTPATIENT)
Age: 69
End: 2024-01-10

## 2024-01-10 DIAGNOSIS — G47.33 OSA (OBSTRUCTIVE SLEEP APNEA): Primary | ICD-10-CM

## 2024-01-11 ENCOUNTER — CLINICAL DOCUMENTATION (OUTPATIENT)
Age: 69
End: 2024-01-11

## 2024-01-11 NOTE — TELEPHONE ENCOUNTER
Diana Jones (: 1955) last seen by me on 10/24/2023, previously seen by  Dr. Silverio on 3/24/2020, prior notes and sleep testing reviewed in detail.  Prior sleep apnea diagnosis in 2016 years ago, treated with APAP.  Home sleep test 3/2020 showed AHI of 17.8/hr with a lowest SpO2 of 86%, duration of SpO2 < 88% 0.2 min. Subsequent titration 2021 showed central events at optimal  pressure, ASV titration completed 2021.     AHI = 17.8(3/2020).  On Resmed S10 APAP 5-9 cm H2O. Set up 10/2016.  3 G Modem no longer sending data.     Previously on ASV, Respironics :  Max IPAP 12, EPAP 6-12, PS 0-6 cmH2O. Set up 3/17/2021. Replacement serial # [id number] set up 2022.      Resmed APAP device brought in for download 2023- data from -10/25/23    Auto pressure: 5-9 cmH2O; Max Pressure: 8.6 cmH2O;  95th percentile Pressure: 8.2 cmH2O   95th Percentile Leak: 16.4 L/Min     CMS Compliance % Used Days >= 4 hours: 40.    Average daily use of 3:41 hours.     Therapy Apnea Index averaged over PAP use: 2.4 /hr which reflects improved sleep breathing condition.    She was scheduled for follow up 23 - appt cancelled by patient.      Call from patient regarding needing new mask prescription.    Orders Placed This Encounter   Procedures    DME Order for (Specify) as OP     Diagnosis: (G47.33) ELLI (obstructive sleep apnea)  (primary encounter diagnosis)     Replacement Supplies for Positive Airway Pressure Therapy Device:   Duration of need: 99 months.        Nasal Cushion (Replace) 2 per month. Nasal wisp fabric mask   Nasal Interface Mask 1 every 3 months.   Pos Airway pressure chin strap   Headgear 1 every 6 months.     Tubing with heating element 1 every 3 months.     Filter(s) Disposable 2 per month.   Filter(s) Non-Disposable 1 every 6 months.   .    Water Chamber for Humidifier (Replace) 1 every 6 months.      JENNIFFER MontesinosBC; NPI: